# Patient Record
Sex: FEMALE | Race: WHITE | NOT HISPANIC OR LATINO | Employment: FULL TIME | URBAN - METROPOLITAN AREA
[De-identification: names, ages, dates, MRNs, and addresses within clinical notes are randomized per-mention and may not be internally consistent; named-entity substitution may affect disease eponyms.]

---

## 2017-03-13 ENCOUNTER — HOSPITAL ENCOUNTER (EMERGENCY)
Facility: HOSPITAL | Age: 42
Discharge: HOME | End: 2017-03-13
Attending: EMERGENCY MEDICINE | Admitting: EMERGENCY MEDICINE
Payer: COMMERCIAL

## 2017-03-13 ENCOUNTER — APPOINTMENT (EMERGENCY)
Dept: RADIOLOGY | Facility: HOSPITAL | Age: 42
End: 2017-03-13
Payer: COMMERCIAL

## 2017-03-13 VITALS
HEIGHT: 64 IN | DIASTOLIC BLOOD PRESSURE: 74 MMHG | HEART RATE: 98 BPM | SYSTOLIC BLOOD PRESSURE: 123 MMHG | BODY MASS INDEX: 32.93 KG/M2 | WEIGHT: 192.9 LBS | RESPIRATION RATE: 18 BRPM | TEMPERATURE: 98.6 F | OXYGEN SATURATION: 100 %

## 2017-03-13 DIAGNOSIS — K29.60 REFLUX GASTRITIS: Primary | ICD-10-CM

## 2017-03-13 LAB
ALBUMIN SERPL BCP-MCNC: 3.8 G/DL (ref 3.5–5)
ALP SERPL-CCNC: 61 U/L (ref 46–116)
ALT SERPL W P-5'-P-CCNC: 20 U/L (ref 12–78)
ANION GAP SERPL CALCULATED.3IONS-SCNC: 11 MMOL/L (ref 4–13)
AST SERPL W P-5'-P-CCNC: 11 U/L (ref 5–45)
ATRIAL RATE: 110 BPM
BASOPHILS # BLD AUTO: 0.03 THOUSANDS/ΜL (ref 0–0.1)
BASOPHILS NFR BLD AUTO: 0 % (ref 0–1)
BILIRUB SERPL-MCNC: 0.4 MG/DL (ref 0.2–1)
BUN SERPL-MCNC: 10 MG/DL (ref 5–25)
CALCIUM SERPL-MCNC: 9 MG/DL (ref 8.3–10.1)
CHLORIDE SERPL-SCNC: 105 MMOL/L (ref 100–108)
CO2 SERPL-SCNC: 23 MMOL/L (ref 21–32)
CREAT SERPL-MCNC: 0.79 MG/DL (ref 0.6–1.3)
DEPRECATED D DIMER PPP: <270 NG/ML (FEU) (ref 0–424)
EOSINOPHIL # BLD AUTO: 0.02 THOUSAND/ΜL (ref 0–0.61)
EOSINOPHIL NFR BLD AUTO: 0 % (ref 0–6)
ERYTHROCYTE [DISTWIDTH] IN BLOOD BY AUTOMATED COUNT: 12.7 % (ref 11.6–15.1)
GFR SERPL CREATININE-BSD FRML MDRD: >60 ML/MIN/1.73SQ M
GLUCOSE SERPL-MCNC: 131 MG/DL (ref 65–140)
HCT VFR BLD AUTO: 40 % (ref 34.8–46.1)
HGB BLD-MCNC: 13.1 G/DL (ref 11.5–15.4)
INR PPP: 1.14 (ref 0.86–1.16)
LYMPHOCYTES # BLD AUTO: 1.57 THOUSANDS/ΜL (ref 0.6–4.47)
LYMPHOCYTES NFR BLD AUTO: 13 % (ref 14–44)
MCH RBC QN AUTO: 29.8 PG (ref 26.8–34.3)
MCHC RBC AUTO-ENTMCNC: 32.8 G/DL (ref 31.4–37.4)
MCV RBC AUTO: 91 FL (ref 82–98)
MONOCYTES # BLD AUTO: 0.57 THOUSAND/ΜL (ref 0.17–1.22)
MONOCYTES NFR BLD AUTO: 5 % (ref 4–12)
NEUTROPHILS # BLD AUTO: 9.77 THOUSANDS/ΜL (ref 1.85–7.62)
NEUTS SEG NFR BLD AUTO: 81 % (ref 43–75)
NRBC BLD AUTO-RTO: 0 /100 WBCS
P AXIS: 62 DEGREES
PLATELET # BLD AUTO: 332 THOUSANDS/UL (ref 149–390)
PMV BLD AUTO: 9.5 FL (ref 8.9–12.7)
POTASSIUM SERPL-SCNC: 3.9 MMOL/L (ref 3.5–5.3)
PR INTERVAL: 152 MS
PROT SERPL-MCNC: 8.2 G/DL (ref 6.4–8.2)
PROTHROMBIN TIME: 14.5 SECONDS (ref 12–14.3)
QRS AXIS: 83 DEGREES
QRSD INTERVAL: 74 MS
QT INTERVAL: 330 MS
QTC INTERVAL: 446 MS
RBC # BLD AUTO: 4.39 MILLION/UL (ref 3.81–5.12)
SODIUM SERPL-SCNC: 139 MMOL/L (ref 136–145)
T WAVE AXIS: 66 DEGREES
TROPONIN I SERPL-MCNC: <0.02 NG/ML
VENTRICULAR RATE: 110 BPM
WBC # BLD AUTO: 12.01 THOUSAND/UL (ref 4.31–10.16)

## 2017-03-13 PROCEDURE — 96374 THER/PROPH/DIAG INJ IV PUSH: CPT

## 2017-03-13 PROCEDURE — 96361 HYDRATE IV INFUSION ADD-ON: CPT

## 2017-03-13 PROCEDURE — 99284 EMERGENCY DEPT VISIT MOD MDM: CPT

## 2017-03-13 PROCEDURE — 84484 ASSAY OF TROPONIN QUANT: CPT | Performed by: EMERGENCY MEDICINE

## 2017-03-13 PROCEDURE — 85610 PROTHROMBIN TIME: CPT | Performed by: EMERGENCY MEDICINE

## 2017-03-13 PROCEDURE — 85025 COMPLETE CBC W/AUTO DIFF WBC: CPT | Performed by: EMERGENCY MEDICINE

## 2017-03-13 PROCEDURE — 71020 HB CHEST X-RAY 2VW FRONTAL&LATL: CPT

## 2017-03-13 PROCEDURE — 36415 COLL VENOUS BLD VENIPUNCTURE: CPT | Performed by: EMERGENCY MEDICINE

## 2017-03-13 PROCEDURE — 85379 FIBRIN DEGRADATION QUANT: CPT | Performed by: EMERGENCY MEDICINE

## 2017-03-13 PROCEDURE — 80053 COMPREHEN METABOLIC PANEL: CPT | Performed by: EMERGENCY MEDICINE

## 2017-03-13 PROCEDURE — 93005 ELECTROCARDIOGRAM TRACING: CPT | Performed by: EMERGENCY MEDICINE

## 2017-03-13 RX ORDER — DIPHENHYDRAMINE HCL 12.5MG/5ML
50 LIQUID (ML) ORAL ONCE
Status: COMPLETED | OUTPATIENT
Start: 2017-03-13 | End: 2017-03-13

## 2017-03-13 RX ORDER — MAGNESIUM HYDROXIDE/ALUMINUM HYDROXICE/SIMETHICONE 120; 1200; 1200 MG/30ML; MG/30ML; MG/30ML
30 SUSPENSION ORAL ONCE
Status: COMPLETED | OUTPATIENT
Start: 2017-03-13 | End: 2017-03-13

## 2017-03-13 RX ORDER — LORAZEPAM 2 MG/ML
1 INJECTION INTRAMUSCULAR ONCE
Status: COMPLETED | OUTPATIENT
Start: 2017-03-13 | End: 2017-03-13

## 2017-03-13 RX ORDER — SUCRALFATE ORAL 1 G/10ML
1000 SUSPENSION ORAL ONCE
Status: COMPLETED | OUTPATIENT
Start: 2017-03-13 | End: 2017-03-13

## 2017-03-13 RX ORDER — FLUTICASONE FUROATE AND VILANTEROL 100; 25 UG/1; UG/1
POWDER RESPIRATORY (INHALATION)
COMMUNITY
End: 2019-05-10

## 2017-03-13 RX ADMIN — LIDOCAINE HYDROCHLORIDE 15 ML: 20 SOLUTION ORAL; TOPICAL at 12:22

## 2017-03-13 RX ADMIN — SUCRALFATE 1000 MG: 1 SUSPENSION ORAL at 12:22

## 2017-03-13 RX ADMIN — SODIUM CHLORIDE 1000 ML: 0.9 INJECTION, SOLUTION INTRAVENOUS at 12:09

## 2017-03-13 RX ADMIN — DIPHENHYDRAMINE HYDROCHLORIDE 50 MG: 12.5 SOLUTION ORAL at 12:23

## 2017-03-13 RX ADMIN — ALUMINUM HYDROXIDE, MAGNESIUM HYDROXIDE, AND SIMETHICONE 30 ML: 200; 200; 20 SUSPENSION ORAL at 12:23

## 2017-03-13 RX ADMIN — LORAZEPAM 1 MG: 2 INJECTION INTRAMUSCULAR; INTRAVENOUS at 12:21

## 2017-04-21 ENCOUNTER — ALLSCRIPTS OFFICE VISIT (OUTPATIENT)
Dept: OTHER | Facility: OTHER | Age: 42
End: 2017-04-21

## 2017-04-21 ENCOUNTER — APPOINTMENT (OUTPATIENT)
Dept: LAB | Facility: OTHER | Age: 42
End: 2017-04-21
Payer: COMMERCIAL

## 2017-04-21 ENCOUNTER — TRANSCRIBE ORDERS (OUTPATIENT)
Dept: LAB | Facility: OTHER | Age: 42
End: 2017-04-21

## 2017-04-21 DIAGNOSIS — R07.89 OTHER CHEST PAIN: ICD-10-CM

## 2017-04-21 DIAGNOSIS — R00.2 PALPITATIONS: ICD-10-CM

## 2017-04-21 LAB
CHOLEST SERPL-MCNC: 245 MG/DL (ref 50–200)
HDLC SERPL-MCNC: 56 MG/DL (ref 40–60)
LDLC SERPL CALC-MCNC: 168 MG/DL (ref 0–100)
TRIGL SERPL-MCNC: 103 MG/DL

## 2017-04-21 PROCEDURE — 80061 LIPID PANEL: CPT

## 2017-04-21 PROCEDURE — 36415 COLL VENOUS BLD VENIPUNCTURE: CPT

## 2017-04-28 ENCOUNTER — HOSPITAL ENCOUNTER (OUTPATIENT)
Dept: NON INVASIVE DIAGNOSTICS | Facility: CLINIC | Age: 42
Discharge: HOME | End: 2017-04-28
Payer: COMMERCIAL

## 2017-04-28 DIAGNOSIS — R00.2 PALPITATIONS: ICD-10-CM

## 2017-04-28 PROCEDURE — 93225 XTRNL ECG REC<48 HRS REC: CPT

## 2017-04-28 PROCEDURE — 93226 XTRNL ECG REC<48 HR SCAN A/R: CPT

## 2018-01-14 VITALS
SYSTOLIC BLOOD PRESSURE: 130 MMHG | HEIGHT: 63 IN | DIASTOLIC BLOOD PRESSURE: 80 MMHG | WEIGHT: 189 LBS | HEART RATE: 120 BPM | OXYGEN SATURATION: 98 % | BODY MASS INDEX: 33.49 KG/M2

## 2018-12-18 ENCOUNTER — APPOINTMENT (OUTPATIENT)
Dept: URGENT CARE | Facility: CLINIC | Age: 43
End: 2018-12-18
Payer: OTHER MISCELLANEOUS

## 2018-12-18 ENCOUNTER — APPOINTMENT (OUTPATIENT)
Dept: RADIOLOGY | Facility: CLINIC | Age: 43
End: 2018-12-18
Payer: OTHER MISCELLANEOUS

## 2018-12-18 DIAGNOSIS — M25.562 LEFT KNEE PAIN, UNSPECIFIED CHRONICITY: Primary | ICD-10-CM

## 2018-12-18 PROCEDURE — 99283 EMERGENCY DEPT VISIT LOW MDM: CPT

## 2018-12-18 PROCEDURE — G0382 LEV 3 HOSP TYPE B ED VISIT: HCPCS

## 2018-12-18 PROCEDURE — 73562 X-RAY EXAM OF KNEE 3: CPT

## 2018-12-19 ENCOUNTER — TRANSCRIBE ORDERS (OUTPATIENT)
Dept: ADMINISTRATIVE | Facility: HOSPITAL | Age: 43
End: 2018-12-19

## 2018-12-19 DIAGNOSIS — S89.92XA INJURY, KNEE, LEFT, INITIAL ENCOUNTER: Primary | ICD-10-CM

## 2018-12-24 ENCOUNTER — HOSPITAL ENCOUNTER (OUTPATIENT)
Dept: MRI IMAGING | Facility: CLINIC | Age: 43
Discharge: HOME | End: 2018-12-24
Payer: OTHER MISCELLANEOUS

## 2018-12-24 DIAGNOSIS — S89.92XA INJURY, KNEE, LEFT, INITIAL ENCOUNTER: ICD-10-CM

## 2018-12-24 PROCEDURE — 73721 MRI JNT OF LWR EXTRE W/O DYE: CPT

## 2018-12-26 ENCOUNTER — APPOINTMENT (OUTPATIENT)
Dept: URGENT CARE | Facility: CLINIC | Age: 43
End: 2018-12-26
Payer: OTHER MISCELLANEOUS

## 2018-12-26 PROCEDURE — 99213 OFFICE O/P EST LOW 20 MIN: CPT

## 2019-05-10 ENCOUNTER — APPOINTMENT (EMERGENCY)
Dept: RADIOLOGY | Facility: HOSPITAL | Age: 44
End: 2019-05-10
Payer: COMMERCIAL

## 2019-05-10 ENCOUNTER — HOSPITAL ENCOUNTER (OUTPATIENT)
Facility: HOSPITAL | Age: 44
Setting detail: OBSERVATION
Discharge: HOME | End: 2019-05-11
Attending: EMERGENCY MEDICINE | Admitting: FAMILY MEDICINE
Payer: COMMERCIAL

## 2019-05-10 DIAGNOSIS — J45.41 MODERATE PERSISTENT ASTHMA WITH EXACERBATION: Primary | ICD-10-CM

## 2019-05-10 DIAGNOSIS — R09.82 POST-NASAL DRIP: ICD-10-CM

## 2019-05-10 PROBLEM — R65.10 SIRS (SYSTEMIC INFLAMMATORY RESPONSE SYNDROME) (HCC): Status: ACTIVE | Noted: 2019-05-10

## 2019-05-10 LAB
ANION GAP SERPL CALCULATED.3IONS-SCNC: 9 MMOL/L (ref 4–13)
BASOPHILS # BLD AUTO: 0.04 THOUSANDS/ΜL (ref 0–0.1)
BASOPHILS NFR BLD AUTO: 0 % (ref 0–1)
BUN SERPL-MCNC: 9 MG/DL (ref 5–25)
CALCIUM SERPL-MCNC: 8.7 MG/DL (ref 8.3–10.1)
CHLORIDE SERPL-SCNC: 104 MMOL/L (ref 100–108)
CO2 SERPL-SCNC: 27 MMOL/L (ref 21–32)
CREAT SERPL-MCNC: 0.79 MG/DL (ref 0.6–1.3)
EOSINOPHIL # BLD AUTO: 0.37 THOUSAND/ΜL (ref 0–0.61)
EOSINOPHIL NFR BLD AUTO: 3 % (ref 0–6)
ERYTHROCYTE [DISTWIDTH] IN BLOOD BY AUTOMATED COUNT: 12.8 % (ref 11.6–15.1)
EXT PREG TEST URINE: NEGATIVE
GFR SERPL CREATININE-BSD FRML MDRD: 93 ML/MIN/1.73SQ M
GLUCOSE SERPL-MCNC: 181 MG/DL (ref 65–140)
HCT VFR BLD AUTO: 41.4 % (ref 34.8–46.1)
HGB BLD-MCNC: 13.8 G/DL (ref 11.5–15.4)
IMM GRANULOCYTES # BLD AUTO: 0.05 THOUSAND/UL (ref 0–0.2)
IMM GRANULOCYTES NFR BLD AUTO: 0 % (ref 0–2)
LYMPHOCYTES # BLD AUTO: 1.4 THOUSANDS/ΜL (ref 0.6–4.47)
LYMPHOCYTES NFR BLD AUTO: 11 % (ref 14–44)
MCH RBC QN AUTO: 30.9 PG (ref 26.8–34.3)
MCHC RBC AUTO-ENTMCNC: 33.3 G/DL (ref 31.4–37.4)
MCV RBC AUTO: 93 FL (ref 82–98)
MONOCYTES # BLD AUTO: 0.9 THOUSAND/ΜL (ref 0.17–1.22)
MONOCYTES NFR BLD AUTO: 7 % (ref 4–12)
NEUTROPHILS # BLD AUTO: 10.35 THOUSANDS/ΜL (ref 1.85–7.62)
NEUTS SEG NFR BLD AUTO: 79 % (ref 43–75)
NRBC BLD AUTO-RTO: 0 /100 WBCS
PLATELET # BLD AUTO: 347 THOUSANDS/UL (ref 149–390)
PLATELET # BLD AUTO: 350 THOUSANDS/UL (ref 149–390)
PMV BLD AUTO: 9.5 FL (ref 8.9–12.7)
PMV BLD AUTO: 9.6 FL (ref 8.9–12.7)
POTASSIUM SERPL-SCNC: 3.9 MMOL/L (ref 3.5–5.3)
RBC # BLD AUTO: 4.46 MILLION/UL (ref 3.81–5.12)
SODIUM SERPL-SCNC: 140 MMOL/L (ref 136–145)
WBC # BLD AUTO: 13.11 THOUSAND/UL (ref 4.31–10.16)

## 2019-05-10 PROCEDURE — 71046 X-RAY EXAM CHEST 2 VIEWS: CPT

## 2019-05-10 PROCEDURE — 99220 PR INITIAL OBSERVATION CARE/DAY 70 MINUTES: CPT | Performed by: FAMILY MEDICINE

## 2019-05-10 PROCEDURE — 96361 HYDRATE IV INFUSION ADD-ON: CPT

## 2019-05-10 PROCEDURE — 94644 CONT INHLJ TX 1ST HOUR: CPT

## 2019-05-10 PROCEDURE — 80048 BASIC METABOLIC PNL TOTAL CA: CPT | Performed by: PHYSICIAN ASSISTANT

## 2019-05-10 PROCEDURE — 96375 TX/PRO/DX INJ NEW DRUG ADDON: CPT

## 2019-05-10 PROCEDURE — 96366 THER/PROPH/DIAG IV INF ADDON: CPT

## 2019-05-10 PROCEDURE — 99244 OFF/OP CNSLTJ NEW/EST MOD 40: CPT | Performed by: PHYSICIAN ASSISTANT

## 2019-05-10 PROCEDURE — 85049 AUTOMATED PLATELET COUNT: CPT | Performed by: FAMILY MEDICINE

## 2019-05-10 PROCEDURE — 81025 URINE PREGNANCY TEST: CPT | Performed by: PHYSICIAN ASSISTANT

## 2019-05-10 PROCEDURE — C9113 INJ PANTOPRAZOLE SODIUM, VIA: HCPCS | Performed by: FAMILY MEDICINE

## 2019-05-10 PROCEDURE — 36415 COLL VENOUS BLD VENIPUNCTURE: CPT | Performed by: PHYSICIAN ASSISTANT

## 2019-05-10 PROCEDURE — 94640 AIRWAY INHALATION TREATMENT: CPT

## 2019-05-10 PROCEDURE — 85025 COMPLETE CBC W/AUTO DIFF WBC: CPT | Performed by: PHYSICIAN ASSISTANT

## 2019-05-10 PROCEDURE — 94760 N-INVAS EAR/PLS OXIMETRY 1: CPT

## 2019-05-10 PROCEDURE — 99284 EMERGENCY DEPT VISIT MOD MDM: CPT | Performed by: PHYSICIAN ASSISTANT

## 2019-05-10 PROCEDURE — 96365 THER/PROPH/DIAG IV INF INIT: CPT

## 2019-05-10 PROCEDURE — 99285 EMERGENCY DEPT VISIT HI MDM: CPT

## 2019-05-10 RX ORDER — ALBUTEROL SULFATE 2.5 MG/3ML
5 SOLUTION RESPIRATORY (INHALATION) ONCE
Status: COMPLETED | OUTPATIENT
Start: 2019-05-10 | End: 2019-05-10

## 2019-05-10 RX ORDER — LEVALBUTEROL 1.25 MG/.5ML
1.25 SOLUTION, CONCENTRATE RESPIRATORY (INHALATION)
Status: DISCONTINUED | OUTPATIENT
Start: 2019-05-10 | End: 2019-05-11 | Stop reason: HOSPADM

## 2019-05-10 RX ORDER — ALBUTEROL SULFATE 2.5 MG/3ML
2.5 SOLUTION RESPIRATORY (INHALATION) EVERY 4 HOURS PRN
Status: DISCONTINUED | OUTPATIENT
Start: 2019-05-10 | End: 2019-05-11 | Stop reason: HOSPADM

## 2019-05-10 RX ORDER — FLUTICASONE FUROATE AND VILANTEROL 100; 25 UG/1; UG/1
1 POWDER RESPIRATORY (INHALATION) DAILY
Status: DISCONTINUED | OUTPATIENT
Start: 2019-05-10 | End: 2019-05-11 | Stop reason: HOSPADM

## 2019-05-10 RX ORDER — SODIUM CHLORIDE 9 MG/ML
125 INJECTION, SOLUTION INTRAVENOUS CONTINUOUS
Status: DISCONTINUED | OUTPATIENT
Start: 2019-05-10 | End: 2019-05-11

## 2019-05-10 RX ORDER — METHYLPREDNISOLONE SODIUM SUCCINATE 40 MG/ML
40 INJECTION, POWDER, LYOPHILIZED, FOR SOLUTION INTRAMUSCULAR; INTRAVENOUS EVERY 8 HOURS SCHEDULED
Status: DISCONTINUED | OUTPATIENT
Start: 2019-05-10 | End: 2019-05-11 | Stop reason: HOSPADM

## 2019-05-10 RX ORDER — LORATADINE 10 MG/1
10 TABLET ORAL DAILY
Status: DISCONTINUED | OUTPATIENT
Start: 2019-05-10 | End: 2019-05-11 | Stop reason: HOSPADM

## 2019-05-10 RX ORDER — SODIUM CHLORIDE FOR INHALATION 0.9 %
3 VIAL, NEBULIZER (ML) INHALATION ONCE
Status: COMPLETED | OUTPATIENT
Start: 2019-05-10 | End: 2019-05-10

## 2019-05-10 RX ORDER — FLUTICASONE PROPIONATE 50 MCG
2 SPRAY, SUSPENSION (ML) NASAL DAILY
Status: DISCONTINUED | OUTPATIENT
Start: 2019-05-10 | End: 2019-05-11 | Stop reason: HOSPADM

## 2019-05-10 RX ORDER — PANTOPRAZOLE SODIUM 40 MG/1
40 INJECTION, POWDER, FOR SOLUTION INTRAVENOUS EVERY 12 HOURS SCHEDULED
Status: DISCONTINUED | OUTPATIENT
Start: 2019-05-10 | End: 2019-05-11

## 2019-05-10 RX ORDER — SODIUM CHLORIDE FOR INHALATION 0.9 %
3 VIAL, NEBULIZER (ML) INHALATION
Status: DISCONTINUED | OUTPATIENT
Start: 2019-05-10 | End: 2019-05-10

## 2019-05-10 RX ORDER — DIPHENOXYLATE HYDROCHLORIDE AND ATROPINE SULFATE 2.5; .025 MG/1; MG/1
1 TABLET ORAL DAILY
COMMUNITY
End: 2022-06-13 | Stop reason: SDUPTHER

## 2019-05-10 RX ORDER — MAGNESIUM SULFATE HEPTAHYDRATE 40 MG/ML
2 INJECTION, SOLUTION INTRAVENOUS ONCE
Status: COMPLETED | OUTPATIENT
Start: 2019-05-10 | End: 2019-05-10

## 2019-05-10 RX ORDER — MOMETASONE FUROATE 50 UG/1
2 SPRAY, METERED NASAL
COMMUNITY
Start: 2019-04-30 | End: 2022-06-13

## 2019-05-10 RX ORDER — LEVALBUTEROL 1.25 MG/.5ML
1.25 SOLUTION, CONCENTRATE RESPIRATORY (INHALATION)
Status: DISCONTINUED | OUTPATIENT
Start: 2019-05-10 | End: 2019-05-10

## 2019-05-10 RX ORDER — METHYLPREDNISOLONE SODIUM SUCCINATE 125 MG/2ML
125 INJECTION, POWDER, LYOPHILIZED, FOR SOLUTION INTRAMUSCULAR; INTRAVENOUS ONCE
Status: COMPLETED | OUTPATIENT
Start: 2019-05-10 | End: 2019-05-10

## 2019-05-10 RX ADMIN — LORATADINE 10 MG: 10 TABLET ORAL at 18:29

## 2019-05-10 RX ADMIN — SODIUM CHLORIDE 125 ML/HR: 0.9 INJECTION, SOLUTION INTRAVENOUS at 08:06

## 2019-05-10 RX ADMIN — ALBUTEROL SULFATE 2.5 MG: 2.5 SOLUTION RESPIRATORY (INHALATION) at 16:13

## 2019-05-10 RX ADMIN — PANTOPRAZOLE SODIUM 40 MG: 40 INJECTION, POWDER, FOR SOLUTION INTRAVENOUS at 15:41

## 2019-05-10 RX ADMIN — IPRATROPIUM BROMIDE 0.5 MG: 0.5 SOLUTION RESPIRATORY (INHALATION) at 20:15

## 2019-05-10 RX ADMIN — IPRATROPIUM BROMIDE 1 MG: 0.5 SOLUTION RESPIRATORY (INHALATION) at 10:20

## 2019-05-10 RX ADMIN — METHYLPREDNISOLONE SODIUM SUCCINATE 40 MG: 40 INJECTION, POWDER, FOR SOLUTION INTRAMUSCULAR; INTRAVENOUS at 22:59

## 2019-05-10 RX ADMIN — ENOXAPARIN SODIUM 40 MG: 40 INJECTION SUBCUTANEOUS at 15:41

## 2019-05-10 RX ADMIN — ALBUTEROL SULFATE 5 MG: 2.5 SOLUTION RESPIRATORY (INHALATION) at 07:55

## 2019-05-10 RX ADMIN — ALBUTEROL SULFATE 10 MG: 2.5 SOLUTION RESPIRATORY (INHALATION) at 10:20

## 2019-05-10 RX ADMIN — LEVALBUTEROL HYDROCHLORIDE 1.25 MG: 1.25 SOLUTION, CONCENTRATE RESPIRATORY (INHALATION) at 20:15

## 2019-05-10 RX ADMIN — IPRATROPIUM BROMIDE 0.5 MG: 0.5 SOLUTION RESPIRATORY (INHALATION) at 07:55

## 2019-05-10 RX ADMIN — ISODIUM CHLORIDE 3 ML: 0.03 SOLUTION RESPIRATORY (INHALATION) at 10:21

## 2019-05-10 RX ADMIN — SODIUM CHLORIDE 125 ML/HR: 0.9 INJECTION, SOLUTION INTRAVENOUS at 17:31

## 2019-05-10 RX ADMIN — RACEPINEPHRINE HYDROCHLORIDE 0.5 ML: 11.25 SOLUTION RESPIRATORY (INHALATION) at 07:55

## 2019-05-10 RX ADMIN — METHYLPREDNISOLONE SODIUM SUCCINATE 40 MG: 40 INJECTION, POWDER, FOR SOLUTION INTRAMUSCULAR; INTRAVENOUS at 15:42

## 2019-05-10 RX ADMIN — METHYLPREDNISOLONE SODIUM SUCCINATE 125 MG: 125 INJECTION, POWDER, FOR SOLUTION INTRAMUSCULAR; INTRAVENOUS at 08:06

## 2019-05-10 RX ADMIN — MAGNESIUM SULFATE HEPTAHYDRATE 2 G: 40 INJECTION, SOLUTION INTRAVENOUS at 10:12

## 2019-05-10 RX ADMIN — PANTOPRAZOLE SODIUM 40 MG: 40 INJECTION, POWDER, FOR SOLUTION INTRAVENOUS at 23:00

## 2019-05-11 VITALS
RESPIRATION RATE: 18 BRPM | HEIGHT: 68 IN | BODY MASS INDEX: 28.03 KG/M2 | DIASTOLIC BLOOD PRESSURE: 77 MMHG | SYSTOLIC BLOOD PRESSURE: 128 MMHG | OXYGEN SATURATION: 95 % | HEART RATE: 115 BPM | TEMPERATURE: 98.2 F | WEIGHT: 184.97 LBS

## 2019-05-11 LAB
ANION GAP SERPL CALCULATED.3IONS-SCNC: 12 MMOL/L (ref 4–13)
BASOPHILS # BLD MANUAL: 0 THOUSAND/UL (ref 0–0.1)
BASOPHILS NFR MAR MANUAL: 0 % (ref 0–1)
BUN SERPL-MCNC: 12 MG/DL (ref 5–25)
CALCIUM SERPL-MCNC: 8.9 MG/DL (ref 8.3–10.1)
CHLORIDE SERPL-SCNC: 108 MMOL/L (ref 100–108)
CO2 SERPL-SCNC: 19 MMOL/L (ref 21–32)
CREAT SERPL-MCNC: 0.76 MG/DL (ref 0.6–1.3)
EOSINOPHIL # BLD MANUAL: 0 THOUSAND/UL (ref 0–0.4)
EOSINOPHIL NFR BLD MANUAL: 0 % (ref 0–6)
ERYTHROCYTE [DISTWIDTH] IN BLOOD BY AUTOMATED COUNT: 13.1 % (ref 11.6–15.1)
GFR SERPL CREATININE-BSD FRML MDRD: 98 ML/MIN/1.73SQ M
GLUCOSE SERPL-MCNC: 170 MG/DL (ref 65–140)
HCT VFR BLD AUTO: 38.6 % (ref 34.8–46.1)
HGB BLD-MCNC: 13.1 G/DL (ref 11.5–15.4)
LYMPHOCYTES # BLD AUTO: 1.24 THOUSAND/UL (ref 0.6–4.47)
LYMPHOCYTES # BLD AUTO: 6 % (ref 14–44)
MAGNESIUM SERPL-MCNC: 2.4 MG/DL (ref 1.6–2.6)
MCH RBC QN AUTO: 31.3 PG (ref 26.8–34.3)
MCHC RBC AUTO-ENTMCNC: 33.9 G/DL (ref 31.4–37.4)
MCV RBC AUTO: 92 FL (ref 82–98)
MONOCYTES # BLD AUTO: 1.04 THOUSAND/UL (ref 0–1.22)
MONOCYTES NFR BLD: 5 % (ref 4–12)
NEUTROPHILS # BLD MANUAL: 18.23 THOUSAND/UL (ref 1.85–7.62)
NEUTS BAND NFR BLD MANUAL: 4 % (ref 0–8)
NEUTS SEG NFR BLD AUTO: 84 % (ref 43–75)
NRBC BLD AUTO-RTO: 0 /100 WBCS
PLATELET # BLD AUTO: 359 THOUSANDS/UL (ref 149–390)
PLATELET BLD QL SMEAR: ADEQUATE
PMV BLD AUTO: 9.8 FL (ref 8.9–12.7)
POTASSIUM SERPL-SCNC: 4.1 MMOL/L (ref 3.5–5.3)
RBC # BLD AUTO: 4.19 MILLION/UL (ref 3.81–5.12)
SODIUM SERPL-SCNC: 139 MMOL/L (ref 136–145)
TOTAL CELLS COUNTED SPEC: 100
VARIANT LYMPHS # BLD AUTO: 1 %
WBC # BLD AUTO: 20.72 THOUSAND/UL (ref 4.31–10.16)

## 2019-05-11 PROCEDURE — 85027 COMPLETE CBC AUTOMATED: CPT | Performed by: FAMILY MEDICINE

## 2019-05-11 PROCEDURE — 85007 BL SMEAR W/DIFF WBC COUNT: CPT | Performed by: FAMILY MEDICINE

## 2019-05-11 PROCEDURE — 83735 ASSAY OF MAGNESIUM: CPT | Performed by: FAMILY MEDICINE

## 2019-05-11 PROCEDURE — 80048 BASIC METABOLIC PNL TOTAL CA: CPT | Performed by: FAMILY MEDICINE

## 2019-05-11 PROCEDURE — 94640 AIRWAY INHALATION TREATMENT: CPT

## 2019-05-11 PROCEDURE — 99217 PR OBSERVATION CARE DISCHARGE MANAGEMENT: CPT | Performed by: INTERNAL MEDICINE

## 2019-05-11 PROCEDURE — 94760 N-INVAS EAR/PLS OXIMETRY 1: CPT

## 2019-05-11 RX ORDER — PREDNISOLONE SODIUM PHOSPHATE 10 MG/1
10 TABLET, ORALLY DISINTEGRATING ORAL DAILY
Qty: 12 TABLET | Refills: 0 | Status: SHIPPED | OUTPATIENT
Start: 2019-05-11 | End: 2022-06-13

## 2019-05-11 RX ORDER — FLUTICASONE FUROATE AND VILANTEROL 100; 25 UG/1; UG/1
1 POWDER RESPIRATORY (INHALATION) DAILY
Qty: 1 INHALER | Refills: 0 | Status: SHIPPED | OUTPATIENT
Start: 2019-05-12 | End: 2022-06-13

## 2019-05-11 RX ORDER — PANTOPRAZOLE SODIUM 40 MG/1
40 TABLET, DELAYED RELEASE ORAL
Status: DISCONTINUED | OUTPATIENT
Start: 2019-05-11 | End: 2019-05-11 | Stop reason: HOSPADM

## 2019-05-11 RX ADMIN — LEVALBUTEROL HYDROCHLORIDE 1.25 MG: 1.25 SOLUTION, CONCENTRATE RESPIRATORY (INHALATION) at 13:36

## 2019-05-11 RX ADMIN — LEVALBUTEROL HYDROCHLORIDE 1.25 MG: 1.25 SOLUTION, CONCENTRATE RESPIRATORY (INHALATION) at 07:29

## 2019-05-11 RX ADMIN — IPRATROPIUM BROMIDE 0.5 MG: 0.5 SOLUTION RESPIRATORY (INHALATION) at 13:36

## 2019-05-11 RX ADMIN — ALBUTEROL SULFATE 2.5 MG: 2.5 SOLUTION RESPIRATORY (INHALATION) at 01:07

## 2019-05-11 RX ADMIN — METHYLPREDNISOLONE SODIUM SUCCINATE 40 MG: 40 INJECTION, POWDER, FOR SOLUTION INTRAMUSCULAR; INTRAVENOUS at 06:34

## 2019-05-11 RX ADMIN — ENOXAPARIN SODIUM 40 MG: 40 INJECTION SUBCUTANEOUS at 08:36

## 2019-05-11 RX ADMIN — FLUTICASONE PROPIONATE 2 SPRAY: 50 SPRAY, METERED NASAL at 08:36

## 2019-05-11 RX ADMIN — FLUTICASONE FUROATE AND VILANTEROL TRIFENATATE 1 PUFF: 100; 25 POWDER RESPIRATORY (INHALATION) at 08:36

## 2019-05-11 RX ADMIN — LORATADINE 10 MG: 10 TABLET ORAL at 08:36

## 2019-05-11 RX ADMIN — IPRATROPIUM BROMIDE 0.5 MG: 0.5 SOLUTION RESPIRATORY (INHALATION) at 07:29

## 2019-05-11 RX ADMIN — PANTOPRAZOLE SODIUM 40 MG: 40 TABLET, DELAYED RELEASE ORAL at 08:36

## 2021-06-25 ENCOUNTER — IMMUNIZATIONS (OUTPATIENT)
Dept: FAMILY MEDICINE CLINIC | Facility: HOSPITAL | Age: 46
End: 2021-06-25

## 2021-06-25 DIAGNOSIS — Z23 ENCOUNTER FOR IMMUNIZATION: Primary | ICD-10-CM

## 2021-06-25 PROCEDURE — 0001A SARS-COV-2 / COVID-19 MRNA VACCINE (PFIZER-BIONTECH) 30 MCG: CPT

## 2021-06-25 PROCEDURE — 91300 SARS-COV-2 / COVID-19 MRNA VACCINE (PFIZER-BIONTECH) 30 MCG: CPT

## 2021-07-16 ENCOUNTER — IMMUNIZATIONS (OUTPATIENT)
Dept: FAMILY MEDICINE CLINIC | Facility: HOSPITAL | Age: 46
End: 2021-07-16

## 2021-07-16 DIAGNOSIS — Z23 ENCOUNTER FOR IMMUNIZATION: Primary | ICD-10-CM

## 2021-07-16 PROCEDURE — 0002A SARS-COV-2 / COVID-19 MRNA VACCINE (PFIZER-BIONTECH) 30 MCG: CPT

## 2021-07-16 PROCEDURE — 91300 SARS-COV-2 / COVID-19 MRNA VACCINE (PFIZER-BIONTECH) 30 MCG: CPT

## 2022-03-25 ENCOUNTER — IMMUNIZATIONS (OUTPATIENT)
Dept: FAMILY MEDICINE CLINIC | Facility: HOSPITAL | Age: 47
End: 2022-03-25

## 2022-03-25 PROCEDURE — 0051A COVID-19 PFIZER VACC TRIS-SUCROSE GRAY CAP 0.3 ML: CPT

## 2022-03-25 PROCEDURE — 91305 COVID-19 PFIZER VACC TRIS-SUCROSE GRAY CAP 0.3 ML: CPT

## 2022-06-13 PROBLEM — J45.41 MODERATE PERSISTENT ASTHMA WITH EXACERBATION: Status: RESOLVED | Noted: 2019-05-10 | Resolved: 2022-06-13

## 2022-06-13 PROBLEM — R65.10 SIRS (SYSTEMIC INFLAMMATORY RESPONSE SYNDROME) (HCC): Status: RESOLVED | Noted: 2019-05-10 | Resolved: 2022-06-13

## 2022-06-13 RX ORDER — LORATADINE 10 MG/1
10 TABLET ORAL DAILY
COMMUNITY

## 2022-06-13 RX ORDER — MULTIVITAMIN,THERAPEUTIC
1 TABLET ORAL DAILY
COMMUNITY
End: 2022-08-01 | Stop reason: ALTCHOICE

## 2022-06-13 RX ORDER — MONTELUKAST SODIUM 10 MG/1
TABLET ORAL
COMMUNITY
End: 2022-06-14 | Stop reason: ALTCHOICE

## 2022-06-13 RX ORDER — FLUTICASONE FUROATE 100 UG/1
POWDER RESPIRATORY (INHALATION)
COMMUNITY
Start: 2022-03-14

## 2022-06-14 ENCOUNTER — OFFICE VISIT (OUTPATIENT)
Dept: FAMILY MEDICINE CLINIC | Facility: CLINIC | Age: 47
End: 2022-06-14
Payer: COMMERCIAL

## 2022-06-14 DIAGNOSIS — H93.13 TINNITUS OF BOTH EARS: ICD-10-CM

## 2022-06-14 DIAGNOSIS — N92.6 ABNORMAL MENSES: ICD-10-CM

## 2022-06-14 DIAGNOSIS — Z13.220 SCREENING, LIPID: ICD-10-CM

## 2022-06-14 DIAGNOSIS — R53.82 CHRONIC FATIGUE: ICD-10-CM

## 2022-06-14 DIAGNOSIS — L65.9 HAIR LOSS: ICD-10-CM

## 2022-06-14 DIAGNOSIS — J45.30 MILD PERSISTENT ASTHMA WITHOUT COMPLICATION: Primary | ICD-10-CM

## 2022-06-14 DIAGNOSIS — Z12.11 SCREENING FOR COLON CANCER: ICD-10-CM

## 2022-06-14 DIAGNOSIS — J30.9 ALLERGIC RHINITIS, UNSPECIFIED SEASONALITY, UNSPECIFIED TRIGGER: ICD-10-CM

## 2022-06-14 DIAGNOSIS — Z11.59 ENCOUNTER FOR HEPATITIS C SCREENING TEST FOR LOW RISK PATIENT: ICD-10-CM

## 2022-06-14 DIAGNOSIS — R73.01 IFG (IMPAIRED FASTING GLUCOSE): ICD-10-CM

## 2022-06-14 PROCEDURE — 3725F SCREEN DEPRESSION PERFORMED: CPT | Performed by: FAMILY MEDICINE

## 2022-06-14 PROCEDURE — 99204 OFFICE O/P NEW MOD 45 MIN: CPT | Performed by: FAMILY MEDICINE

## 2022-06-14 NOTE — PROGRESS NOTES
Jemal Marrow 1975 female MRN: [id number]      ASSESSMENT/PLAN  Problem List Items Addressed This Visit        Respiratory    Allergic rhinitis    Relevant Orders    CBC and differential    Asthma - Primary    Relevant Medications    Arnuity Ellipta 100 MCG/ACT AEPB inhaler    Other Relevant Orders    CBC and differential      Other Visit Diagnoses     Tinnitus of both ears        Relevant Orders    Ambulatory Referral to Otolaryngology    Chronic fatigue        Relevant Orders    CBC and differential    TSH, 3rd generation with Free T4 reflex    Vitamin D 25 hydroxy    Hair loss        Relevant Orders    CBC and differential    TSH, 3rd generation with Free T4 reflex    Abnormal menses        Relevant Orders    CBC and differential    Estrogens, total    Progesterone    Luteinizing hormone    Follicle stimulating hormone    IFG (impaired fasting glucose)        Relevant Orders    Comprehensive metabolic panel    HEMOGLOBIN A1C W/ EAG ESTIMATION    Screening for colon cancer        Relevant Orders    Cologuard    Screening, lipid        Relevant Orders    Lipid panel    Encounter for hepatitis C screening test for low risk patient        Relevant Orders    Hepatitis C antibody        Asthma/Allergies:  Well controlled on current regimen   Refer to ENT for further evaluation of tinnitus, discussed it may be secondary to high pitch hearing loss   Will update labs as above to eval constellation of symptoms as below     BP WNL   Hep C screening ordered, pt agreeable; HIV screening deferred   Pap UTD (11/2021) -- will link records from [address] [place] [address] UTD (12/2021) -- will link records from [facility] -- agreeable to Cologuard   Vaccinations: Pneumo defererd, TDap deferred, COVID UTD   Encouraged regular physical activity, varied diet, and regular dental/eye exams     Future Appointments   Date Time Provider Mounika Mai   6/17/2022  4:00 PM Page Boss MD [facility] 6/21/2022 10:40 AM Aashish Rosales PA-C [facility] Cardio [facility]   7/5/2022  9:00 AM Mayte Gaffney MD [facility]          SUBJECTIVE  CC: Physical Exam and Tinnitus (X 1 year)      HPI:  Farnaz Campbell is a 41 y o  female who presents to [facility] care  History reviewed and updated as below  Asthma/Allergies: On Arnuity, no MANNY use >1 year; Claritin daily     Review of Systems   Constitutional: Positive for fatigue  Negative for unexpected weight change  (+) hair thinning    HENT: Positive for tinnitus (chronic >1 year)  Negative for congestion, ear pain, rhinorrhea, sneezing and sore throat  (+) fullness under R ear   Eyes: Negative for visual disturbance (wears glasses )  Respiratory: Negative for cough, shortness of breath and wheezing  Cardiovascular: Negative for chest pain, palpitations (feels like she is skipping a beat or racing when she is mid-cycle -- is schedule with Cardiology) and leg swelling  Gastrointestinal: Negative for abdominal pain, constipation and diarrhea  (+) intermittent heart burn   Endocrine: Negative for polyuria  Genitourinary: Negative for dysuria  Menstrual problem: starting to be shorter cycles  Musculoskeletal: Positive for arthralgias (ankles get stiff at the end of the day)  Neurological: Negative for dizziness and headaches  Psychiatric/Behavioral: Negative for sleep disturbance         Historical Information   The patient history was reviewed and updated as follows:    Past Medical History:   Diagnosis Date    Asthma     Dermatitis     Dyslipidemia, goal LDL below 160     Ovarian cyst 02/2008    Varicella without complication      Past Surgical History:   Procedure Laterality Date    NO PAST SURGERIES       Family History   Problem Relation Age of Onset    Diabetes Father     Hepatitis Father     Stroke Maternal Grandmother     Anemia Maternal Grandmother     Breast cancer Paternal Grandmother     Stroke Paternal Grandfather     Coronary artery disease Paternal Grandfather       Social History   Social History     Substance and Sexual Activity   Alcohol Use Yes    Comment: Occasional     Social History     Substance and Sexual Activity   Drug Use Never     Social History     Tobacco Use   Smoking Status Never Smoker   Smokeless Tobacco Never Used       Medications:     Current Outpatient Medications:     Arnuity Ellipta 100 MCG/ACT AEPB inhaler, , Disp: , Rfl:     Fish Oil-Cholecalciferol (COROMEGA OMEGA 3+D SQUEEZE PO), , Disp: , Rfl:     loratadine (CLARITIN) 10 mg tablet, Take 10 mg by mouth daily, Disp: , Rfl:     Multiple Vitamins-Minerals (Oncovite) TABS, Take 1 tablet by mouth daily, Disp: , Rfl:   Allergies   Allergen Reactions    [place] (Diagnostic) - Food Allergy Other (See Comments)    Cat Hair Extract Other (See Comments)    Cephalexin Hives and Other (See Comments)    Dog Epithelium Other (See Comments)    Egg White (Diagnostic) - Food Allergy Other (See Comments)    Penicillins Hives     Other reaction(s): Unknown      Pollen Extract Other (See Comments)    Latex Itching, Rash and Swelling       OBJECTIVE    Vitals:   Vitals:    06/14/22 1334   BP: 128/80   Pulse: 89   Temp: 98 9 °F (37 2 °C)   SpO2: 99%   Weight: 80 7 kg (178 lb)   Height: 5' 8" (1 727 m)           Physical Exam  Vitals and nursing note reviewed  Constitutional:       General: She is not in acute distress  Appearance: Normal appearance  HENT:      Head: Normocephalic and atraumatic  Right Ear: Tympanic membrane, ear canal and external ear normal       Left Ear: Tympanic membrane, ear canal and external ear normal       Nose: Nose normal       Mouth/Throat:      Mouth: Mucous membranes are moist       Pharynx: No oropharyngeal exudate or posterior oropharyngeal erythema  Eyes:      Conjunctiva/sclera: Conjunctivae normal    Cardiovascular:      Rate and Rhythm: Normal rate and regular rhythm     Pulmonary: Effort: Pulmonary effort is normal  No respiratory distress  Breath sounds: Normal breath sounds  Abdominal:      General: Bowel sounds are normal  There is no distension  Palpations: Abdomen is soft  Tenderness: There is no abdominal tenderness  Musculoskeletal:      Right lower leg: No edema  Left lower leg: No edema  Lymphadenopathy:      Cervical: No cervical adenopathy  Skin:     General: Skin is warm and dry  Neurological:      General: No focal deficit present  Mental Status: She is alert     Psychiatric:         Mood and Affect: Mood normal                     Kristin Camargo DO  [facility]   6/14/2022  2:04 PM

## 2022-06-15 ENCOUNTER — TELEPHONE (OUTPATIENT)
Dept: ADMINISTRATIVE | Facility: OTHER | Age: 47
End: 2022-06-15

## 2022-06-15 VITALS
SYSTOLIC BLOOD PRESSURE: 128 MMHG | HEIGHT: 65 IN | OXYGEN SATURATION: 99 % | BODY MASS INDEX: 29.66 KG/M2 | DIASTOLIC BLOOD PRESSURE: 80 MMHG | HEART RATE: 89 BPM | WEIGHT: 178 LBS | TEMPERATURE: 98.9 F

## 2022-06-15 NOTE — TELEPHONE ENCOUNTER
----- Message from Asael Dsouza MA sent at 6/14/2022  1:43 PM EDT -----  Regarding: SANTOSH/ PAP  06/14/22 1:43 PM    Hello, our patient Bob Mckeon has had Mammogram and Pap Smear (HPV) aka Cervical Cancer Screening completed/performed  Please assist in updating the patient chart by pulling the Care Everywhere (CE) document  The date of service is 2021       Thank you,  Elo Engel MA   ISABELL SYKES

## 2022-06-15 NOTE — TELEPHONE ENCOUNTER
Upon review of the In Basket request we were able to locate, review, and update the patient chart as requested for Mammogram  Pap smear  Any additional questions or concerns should be emailed to the Practice Liaisons via [email or web address] email, please do not reply via In Basket      Thank you  Bindu Harris

## 2022-06-17 ENCOUNTER — OFFICE VISIT (OUTPATIENT)
Dept: INTERNAL MEDICINE CLINIC | Facility: CLINIC | Age: 47
End: 2022-06-17
Payer: COMMERCIAL

## 2022-06-17 VITALS
WEIGHT: 179.4 LBS | BODY MASS INDEX: 29.89 KG/M2 | DIASTOLIC BLOOD PRESSURE: 78 MMHG | TEMPERATURE: 98 F | OXYGEN SATURATION: 98 % | SYSTOLIC BLOOD PRESSURE: 130 MMHG | HEART RATE: 92 BPM | HEIGHT: 65 IN | RESPIRATION RATE: 18 BRPM

## 2022-06-17 DIAGNOSIS — E66.3 OVERWEIGHT: ICD-10-CM

## 2022-06-17 DIAGNOSIS — J45.40 MODERATE PERSISTENT ASTHMA WITHOUT COMPLICATION: Primary | ICD-10-CM

## 2022-06-17 DIAGNOSIS — J30.89 NON-SEASONAL ALLERGIC RHINITIS, UNSPECIFIED TRIGGER: ICD-10-CM

## 2022-06-17 PROCEDURE — 99204 OFFICE O/P NEW MOD 45 MIN: CPT | Performed by: INTERNAL MEDICINE

## 2022-06-17 NOTE — PROGRESS NOTES
INTERNAL MEDICINE OFFICE VISIT  [facility] [address], [place], [address] [facility]  Tel: [phone]      NAME: Monika Shaver  AGE: 41 y o  SEX: female  : 1975   MRN: [id number]    DATE: 2022  TIME: 4:17 PM      Assessment and Plan:  1  Moderate persistent asthma without complication  Continue inhalers    2  Non-seasonal allergic rhinitis, unspecified trigger   continue Claritin    3  Overweight  BMI Counseling: Body mass index is 29 85 kg/m²  The BMI is above normal  Nutrition recommendations include decreasing portion sizes, encouraging healthy choices of fruits and vegetables and moderation in carbohydrate intake  Exercise recommendations include moderate physical activity 150 minutes/week  Rationale for BMI follow-up plan is due to patient being overweight or obese  - Counseling Documentation: patient was counseled regarding: instructions for management, risk factor reductions, prognosis, patient and family education, risks and benefits of treatment options and importance of compliance with treatment  - Medication Side Effects: Adverse side effects of medications were reviewed with the patient/guardian today  Return for follow up visit in  1 year or earlier, if needed  Chief Complaint:  Chief Complaint   Patient presents with    New Patient Visit         History of Present Illness:    is a new patient was here to establish  She has asthma for which she is taking the inhaler which is prescribed by the pulmonologist     She also has allergies which are non seasonal and to multiple things    She has to take Claritin around the year  She needs to lose weight      Active Problem List:  Patient Active Problem List   Diagnosis    Allergic rhinitis    Moderate persistent asthma without complication    Overweight         Past Medical History:  Past Medical History:   Diagnosis Date    Asthma     Dermatitis     Dyslipidemia, goal LDL below 160     Ovarian cyst 02/2008    Varicella without complication          Past Surgical History:  Past Surgical History:   Procedure Laterality Date    NO PAST SURGERIES           Family History:  Family History   Problem Relation Age of Onset    Diabetes Father     Hepatitis Father     Stroke Maternal Grandmother     Anemia Maternal Grandmother     Breast cancer Paternal Grandmother     Stroke Paternal Grandfather     Coronary artery disease Paternal Grandfather          Social History:  Social History     Socioeconomic History    Marital status: /Civil Union     Spouse name: None    Number of children: None    Years of education: None    Highest education level: None   Occupational History    None   Tobacco Use    Smoking status: Never Smoker    Smokeless tobacco: Never Used   Vaping Use    Vaping Use: Never used   Substance and Sexual Activity    Alcohol use: Yes     Comment: Occasional    Drug use: Never    Sexual activity: None   Other Topics Concern    None   Social History Narrative    Lives with     Work as a  for non-profit      Social [employer]     Financial Resource Strain: Not on file   Food Insecurity: Not on file   Transportation Needs: Not on file   Physical Activity: Not on file   Stress: Not on file   Social Connections: Not on file   Intimate Partner Violence: Not on file   Housing Stability: Not on file         Allergies:   Allergies   Allergen Reactions    [place] (Diagnostic) - Food Allergy Other (See Comments)    Cat Hair Extract Other (See Comments)    Cephalexin Hives and Other (See Comments)    Dog Epithelium Other (See Comments)    Egg White (Diagnostic) - Food Allergy Other (See Comments)    Penicillins Hives     Other reaction(s): Unknown      Pollen Extract Other (See Comments)    Latex Itching, Rash and Swelling         Medications:    Current Outpatient Medications:     Arnuity Ellipta 100 MCG/ACT AEPB inhaler, , Disp: , Rfl:     Fish Oil-Cholecalciferol (Nani Rajesh 3+D SQUEEZE PO), , Disp: , Rfl:     loratadine (CLARITIN) 10 mg tablet, Take 10 mg by mouth daily, Disp: , Rfl:     Multiple Vitamins-Minerals (Oncovite) TABS, Take 1 tablet by mouth daily, Disp: , Rfl:       The following portions of the patient's history were reviewed and updated as appropriate: past medical history, past surgical history, family history, social history, allergies, current medications and active problem list       Review of Systems:  Constitutional: Denies fever, chills, weight gain, weight loss, fatigue  Eyes: Denies eye redness, eye discharge, double vision, change in visual acuity  ENT: Denies hearing loss, tinnitus, sneezing, nasal congestion, nasal discharge, sore throat   Respiratory: Denies cough, expectoration, hemoptysis, shortness of breath, wheezing  Cardiovascular: Denies chest pain, palpitations, lower extremity swelling, orthopnea, PND  Gastrointestinal: Denies abdominal pain, heartburn, nausea, vomiting, hematemesis, diarrhea, bloody stools  Genito-Urinary: Denies dysuria, frequency, difficulty in micturition, nocturia, incontinence  Musculoskeletal: Denies back pain, joint pain, muscle pain  Neurologic: Denies confusion, lightheadedness, syncope, headache, focal weakness, sensory changes, seizures  Endocrine: Denies polyuria, polydipsia, temperature intolerance  Allergy and Immunology: Denies hives, insect bite sensitivity  Hematological and Lymphatic: Denies bleeding problems, swollen glands   Psychological: Denies depression, suicidal ideation, anxiety, panic, mood swings  Dermatological: Denies pruritus, rash, skin lesion changes      Vitals:  Vitals:    06/17/22 1616   BP: 130/78   Pulse:    Resp:    Temp:    SpO2:        Body mass index is 29 85 kg/m²  Weight (last 2 days)     Date/Time Weight    06/17/22 1549 81 4 (179 4)            Physical Examination:  General: Patient is not in acute distress  Awake, alert, responding to commands  No weight gain or loss  Head: Normocephalic  Atraumatic  Eyes: Conjunctiva and lids with no swelling, erythema or discharge  Both pupils normal sized, round and reactive to light  Sclera nonicteric  ENT: External examination of nose and ear normal  Otoscopic examination shows translucent tympanic membranes with patent canals without erythema  Oropharynx moist with no erythema, edema, exudate or lesions  Neck: Supple  JVP not raised  Trachea midline  No masses  No thyromegaly  Lungs: No signs of increased work of breathing or respiratory distress  Bilateral bronchovascular breath sounds with no crackles or rhonchi  Chest wall: No tenderness  Cardiovascular: Normal PMI  No thrills  Regular rate and rhythm  S1 and S2 normal  No murmur, rub or gallop  Gastrointestinal: Abdomen soft, nontender  No guarding or rigidity  Liver and spleen not palpable  Bowel sounds present  Neurologic: Cranial nerves II-XII intact   Cortical functions normal  Motor system - Reflexes 2+ and symmetrical  Sensations normal  Musculoskeletal: Gait normal  No joint tenderness  Integumentary: Skin normal with no rash or lesions  Lymphatic: No palpable lymph nodes in neck, axilla or groin  Extremities: No clubbing, cyanosis, edema or varicosities  Psychological: Judgement and insight normal  Mood and affect normal      Laboratory Results:  CBC with diff:   Lab Results   Component Value Date    WBC 20 72 (H) 05/11/2019    WBC 8 84 01/13/2014    RBC 4 19 05/11/2019    RBC 4 94 01/13/2014    HGB 13 1 05/11/2019    HGB 14 9 01/13/2014    HCT 38 6 05/11/2019    HCT 45 6 01/13/2014    MCV 92 05/11/2019    MCV 92 01/13/2014    MCH 31 3 05/11/2019    MCH 30 2 01/13/2014    RDW 13 1 05/11/2019    RDW 12 7 01/13/2014     05/11/2019     01/13/2014       CMP:  Lab Results   Component Value Date    CREATININE 0 76 05/11/2019    CREATININE 0 71 01/13/2014    BUN 12 05/11/2019    BUN 15 01/13/2014     01/13/2014    K 4 1 05/11/2019    K 4 8 01/13/2014     05/11/2019     01/13/2014    CO2 19 (L) 05/11/2019    CO2 27 01/13/2014    GLUCOSE 91 01/13/2014    PROT 8 4 (H) 01/13/2014    ALKPHOS 61 03/13/2017    ALKPHOS 65 01/13/2014    ALT 20 03/13/2017    ALT 24 01/13/2014    AST 11 03/13/2017    AST 26 01/13/2014       Lab Results   Component Value Date    MG 2 4 05/11/2019       Lab Results   Component Value Date    TROPONINI <0 02 03/13/2017       Lipid Profile:   No results found for: CHOL  Lab Results   Component Value Date    HDL 56 04/21/2017     Lab Results   Component Value Date    LDLCALC 168 (H) 04/21/2017     Lab Results   Component Value Date    TRIG 103 04/21/2017       Imaging Results:  Hm Mammography  This Care Everywhere (CE) document can be found within the Care Everywhere (CE) Activity  Linking this document to the order is not available at this time  Hm Mammography  This Care Everywhere (CE) document can be found within the Care Everywhere (CE) Activity  Linking this document to the order is not available at this time          Health Maintenance:  Health Maintenance   Topic Date Due    Hepatitis C Screening  Never done    BMI: Followup Plan  Never done    Annual Physical  Never done    Colorectal Cancer Screening  05/23/2022    Influenza Vaccine (Season Ended) 09/01/2022    HIV Screening  07/14/2022 (Originally 5/23/1992)    Pneumococcal Vaccine: Pediatrics (0 to 5 Years) and At-Risk Patients (6 to 59 Years) (1 - PCV) 06/14/2023 (Originally 5/23/1983)    DTaP,Tdap,and Td Vaccines (3 - Td or Tdap) 06/14/2023 (Originally 1/1/2021)    Breast Cancer Screening: Mammogram  12/01/2022    Depression Screening  06/14/2023    BMI: Adult  06/17/2023    Cervical Cancer Screening  11/24/2026    COVID-19 Vaccine  Completed    HIB Vaccine  Aged Out    Hepatitis B Vaccine  Aged Out    IPV Vaccine  Aged Out    Hepatitis A Vaccine  Aged Out    Meningococcal ACWY Vaccine  Aged Out    HPV Vaccine  Aged Out     Immunization History   Administered Date(s) Administered    COVID-19 PFIZER VACCINE 0 3 ML IM 06/25/2021, 07/16/2021    COVID-19 Pfizer vac (Terry-sucrose, gray cap) 12 yr+ IM 03/25/2022    Hep B, adult 12/10/2010    Tdap 12/10/2010, 01/01/2011         Andria Hernández MD  6/17/2022,4:17 PM  Answers for HPI/ROS submitted by the patient on 6/15/2022  Affected ear: right  Chronicity: recurrent  Onset: more than 1 month ago  Progression since onset: unchanged  Frequency: every few hours  Fever: no fever  Pain - numeric: 2/10  abdominal pain: No  ear discharge: No  rash: No  cough: No  headaches: No  rhinorrhea: Yes  diarrhea: No  hearing loss: No  sore throat: No  neck pain: Yes  vomiting: No

## 2022-06-21 ENCOUNTER — OFFICE VISIT (OUTPATIENT)
Dept: CARDIOLOGY CLINIC | Facility: CLINIC | Age: 47
End: 2022-06-21
Payer: COMMERCIAL

## 2022-06-21 VITALS
BODY MASS INDEX: 31.54 KG/M2 | SYSTOLIC BLOOD PRESSURE: 110 MMHG | HEIGHT: 63 IN | WEIGHT: 178 LBS | HEART RATE: 77 BPM | DIASTOLIC BLOOD PRESSURE: 82 MMHG

## 2022-06-21 DIAGNOSIS — R00.2 PALPITATION: Primary | ICD-10-CM

## 2022-06-21 DIAGNOSIS — R06.02 SOB (SHORTNESS OF BREATH): ICD-10-CM

## 2022-06-21 PROCEDURE — 93000 ELECTROCARDIOGRAM COMPLETE: CPT | Performed by: PHYSICIAN ASSISTANT

## 2022-06-21 PROCEDURE — 1036F TOBACCO NON-USER: CPT | Performed by: PHYSICIAN ASSISTANT

## 2022-06-21 PROCEDURE — 3008F BODY MASS INDEX DOCD: CPT | Performed by: PHYSICIAN ASSISTANT

## 2022-06-21 PROCEDURE — 99204 OFFICE O/P NEW MOD 45 MIN: CPT | Performed by: PHYSICIAN ASSISTANT

## 2022-06-21 NOTE — PROGRESS NOTES
[facility]   Outpatient Note  Jose Alberto Sorto  1975  [id number]  [address] [facility]  [address]  [facility] [address]  [address]    Jose Alberto Sorto is a 41 y o  female    Assessment and Plan:   SOB (shortness of breath)  Breathlessness with exertion in the setting of asthma; however would like to rule out cardiac etiology  Two-dimensional echocardiogram will be obtained to assess LV function and to check for [facility]    Palpitation  Sensation of skipped beats that is usually felt during her menstrual  cycle (usually midcycle)  This usually takes her breath away and lasts seconds in duration  Zio monitor will be obtained to assess for concerning arrhythmias  Additional Plan:   Medications as detailed above  Pertinent testing orders as outlined  Available lab and test results are reviewed with the patient and any additional required labs are ordered as noted  Return visit will be in one month or earlier if there are problems  The patient is encouraged to call in the meantime if there are questions or concerns  Subjective: The patient is seen in the office today as a new patient with complaints of palpitations and shortness of breath  Her shortness of breath is in the setting of asthma  Usually alleviated with her breathing treatments  This has been occurring for years and has been stable  Palpitations are described as a sensation of "skipped beats" that is usually felt during her menstrual  cycle (usually midcycle)  This usually "takes her breath away" and lasts seconds in duration  At times it feels as though her heart is speeding up  She is concerned because strokes run in her family her maternal grandmother had a stroke at the age of 79 and her paternal grandfather had a stroke at the age of 46  Otherwise, From a cardiac perspective, she is without complaints of chest pain  She has no syncope or near syncope, and denies edema orthopnea or PND  She does not complain of TIA or CVA symptoms  She denies any exertional neck, jaw, arm or back pain  Social History  Social History     Tobacco Use   Smoking Status Never Smoker   Smokeless Tobacco Never Used   ,   Social History     Substance and Sexual Activity   Alcohol Use Yes    Comment: Occasional   ,   Social History     Substance and Sexual Activity   Drug Use Never     Family History   Problem Relation Age of Onset    Diabetes Father     Hepatitis Father     Stroke Maternal Grandmother     Anemia Maternal Grandmother     Breast cancer Paternal Wolm Bimler     Stroke Paternal Grandfather     Coronary artery disease Paternal Grandfather        Medical and Surgical History  Past Medical History:   Diagnosis Date    Asthma     Dermatitis     Dyslipidemia, goal LDL below 160     Ovarian cyst 02/2008    Varicella without complication      Past Surgical History:   Procedure Laterality Date    NO PAST SURGERIES           Current Outpatient Medications:     Arnuity Ellipta 100 MCG/ACT AEPB inhaler, , Disp: , Rfl:     Fish Oil-Cholecalciferol (COROMEGA OMEGA 3+D SQUEEZE PO), , Disp: , Rfl:     loratadine (CLARITIN) 10 mg tablet, Take 10 mg by mouth daily, Disp: , Rfl:     Multiple Vitamins-Minerals (Oncovite) TABS, Take 1 tablet by mouth daily, Disp: , Rfl:   Allergies   Allergen Reactions    [place] (Diagnostic) - Food Allergy Other (See Comments)    Cat Hair Extract Other (See Comments)    Cephalexin Hives and Other (See Comments)    Dog Epithelium Other (See Comments)    Egg White (Diagnostic) - Food Allergy Other (See Comments)    Penicillins Hives     Other reaction(s): Unknown      Pollen Extract Other (See Comments)    Latex Itching, Rash and Swelling       Review of Systems   Constitutional: Negative  HENT: Negative  Eyes: Negative      Cardiovascular: Positive for dyspnea on exertion and palpitations  Negative for chest pain, claudication, cyanosis, irregular heartbeat, leg swelling, near-syncope, orthopnea, paroxysmal nocturnal dyspnea and syncope  Respiratory: Negative  Negative for cough, hemoptysis, shortness of breath, sleep disturbances due to breathing, snoring, sputum production and wheezing  Endocrine: Negative  Hematologic/Lymphatic: Negative  Skin: Negative  Musculoskeletal: Negative  Gastrointestinal: Negative  Genitourinary: Negative  Neurological: Negative  Psychiatric/Behavioral: Negative  Allergic/Immunologic: Negative  Objective:   /82   Pulse 77   Ht 5' 3" (1 6 m)   Wt 80 7 kg (178 lb)   BMI 31 53 kg/m²   Physical Exam  Vitals and nursing note reviewed  Constitutional:       Appearance: She is well-developed  She is obese  HENT:      Head: Normocephalic and atraumatic  Mouth/Throat:      Mouth: Mucous membranes are moist    Eyes:      General: No scleral icterus  Conjunctiva/sclera: Conjunctivae normal    Neck:      Vascular: No JVD  Trachea: No tracheal deviation  Cardiovascular:      Rate and Rhythm: Normal rate and regular rhythm  Pulses: Intact distal pulses  Heart sounds: Normal heart sounds, S1 normal and S2 normal  No murmur heard  No friction rub  No gallop  Pulmonary:      Effort: Pulmonary effort is normal  No respiratory distress  Breath sounds: Normal breath sounds  No wheezing or rales  Chest:      Chest wall: No tenderness  Abdominal:      General: Bowel sounds are normal  There is no distension  Palpations: Abdomen is soft  Tenderness: There is no abdominal tenderness  Comments: Aorta not palpable    Musculoskeletal:         General: No tenderness  Normal range of motion  Cervical back: Normal range of motion and neck supple  Right lower leg: No edema  Left lower leg: No edema  Skin:     General: Skin is warm and dry        Coloration: Skin is not pale       Findings: No erythema or rash  Neurological:      General: No focal deficit present  Mental Status: She is alert and oriented to person, place, and time     Psychiatric:         Mood and Affect: Mood normal          Behavior: Behavior normal          Judgment: Judgment normal          Lab Review:   No results found for: CHOL  Lab Results   Component Value Date    HDL 56 04/21/2017     Lab Results   Component Value Date    LDLCALC 168 (H) 04/21/2017     Lab Results   Component Value Date    TRIG 103 04/21/2017     Results Reviewed     None        Results Reviewed     None        Results Reviewed     None          Recent Cardiovascular Testing:   Holter monitor and echo will be obtained    ECG Review:   06/21/2022: Normal sinus rhythm

## 2022-06-21 NOTE — ASSESSMENT & PLAN NOTE
Sensation of skipped beats that is usually felt during her menstrual  cycle (usually midcycle)  This usually takes her breath away and lasts seconds in duration  At times it feels as though her heart is speeding up  Zio monitor will be obtained to assess for concerning arrhythmias

## 2022-06-21 NOTE — ASSESSMENT & PLAN NOTE
Breathlessness with exertion in the setting of asthma; however would like to rule out cardiac etiology    Two-dimensional echocardiogram will be obtained to assess LV function and to check for [facility]

## 2022-06-24 PROBLEM — J45.30 MILD PERSISTENT ASTHMA WITHOUT COMPLICATION: Status: ACTIVE | Noted: 2022-06-17

## 2022-06-24 PROBLEM — R06.02 SOB (SHORTNESS OF BREATH): Status: RESOLVED | Noted: 2022-06-21 | Resolved: 2022-06-24

## 2022-07-13 ENCOUNTER — APPOINTMENT (OUTPATIENT)
Dept: LAB | Facility: CLINIC | Age: 47
End: 2022-07-13
Payer: COMMERCIAL

## 2022-07-13 DIAGNOSIS — Z13.220 SCREENING, LIPID: ICD-10-CM

## 2022-07-13 DIAGNOSIS — R73.01 IFG (IMPAIRED FASTING GLUCOSE): ICD-10-CM

## 2022-07-13 DIAGNOSIS — J30.9 ALLERGIC RHINITIS, UNSPECIFIED SEASONALITY, UNSPECIFIED TRIGGER: ICD-10-CM

## 2022-07-13 DIAGNOSIS — J45.30 MILD PERSISTENT ASTHMA WITHOUT COMPLICATION: ICD-10-CM

## 2022-07-13 DIAGNOSIS — N92.6 ABNORMAL MENSES: ICD-10-CM

## 2022-07-13 DIAGNOSIS — Z11.59 ENCOUNTER FOR HEPATITIS C SCREENING TEST FOR LOW RISK PATIENT: ICD-10-CM

## 2022-07-13 DIAGNOSIS — R53.82 CHRONIC FATIGUE: ICD-10-CM

## 2022-07-13 DIAGNOSIS — H91.8X9 ASYMMETRICAL HEARING LOSS: ICD-10-CM

## 2022-07-13 DIAGNOSIS — H93.13 TINNITUS OF BOTH EARS: ICD-10-CM

## 2022-07-13 DIAGNOSIS — L65.9 HAIR LOSS: ICD-10-CM

## 2022-07-13 LAB
25(OH)D3 SERPL-MCNC: 39.7 NG/ML (ref 30–100)
ALBUMIN SERPL BCP-MCNC: 3.8 G/DL (ref 3.5–5)
ALP SERPL-CCNC: 59 U/L (ref 46–116)
ALT SERPL W P-5'-P-CCNC: 36 U/L (ref 12–78)
ANION GAP SERPL CALCULATED.3IONS-SCNC: 3 MMOL/L (ref 4–13)
AST SERPL W P-5'-P-CCNC: 22 U/L (ref 5–45)
BASOPHILS # BLD AUTO: 0.04 THOUSANDS/ΜL (ref 0–0.1)
BASOPHILS NFR BLD AUTO: 1 % (ref 0–1)
BILIRUB SERPL-MCNC: 0.53 MG/DL (ref 0.2–1)
BUN SERPL-MCNC: 12 MG/DL (ref 5–25)
CALCIUM SERPL-MCNC: 9 MG/DL (ref 8.3–10.1)
CHLORIDE SERPL-SCNC: 107 MMOL/L (ref 100–108)
CHOLEST SERPL-MCNC: 242 MG/DL
CO2 SERPL-SCNC: 28 MMOL/L (ref 21–32)
CREAT SERPL-MCNC: 0.8 MG/DL (ref 0.6–1.3)
EOSINOPHIL # BLD AUTO: 0.94 THOUSAND/ΜL (ref 0–0.61)
EOSINOPHIL NFR BLD AUTO: 12 % (ref 0–6)
ERYTHROCYTE [DISTWIDTH] IN BLOOD BY AUTOMATED COUNT: 12.8 % (ref 11.6–15.1)
FSH SERPL-ACNC: 4.1 MIU/ML
GFR SERPL CREATININE-BSD FRML MDRD: 89 ML/MIN/1.73SQ M
GLUCOSE P FAST SERPL-MCNC: 100 MG/DL (ref 65–99)
HCT VFR BLD AUTO: 41 % (ref 34.8–46.1)
HDLC SERPL-MCNC: 61 MG/DL
HGB BLD-MCNC: 13.4 G/DL (ref 11.5–15.4)
IMM GRANULOCYTES # BLD AUTO: 0.02 THOUSAND/UL (ref 0–0.2)
IMM GRANULOCYTES NFR BLD AUTO: 0 % (ref 0–2)
LDLC SERPL CALC-MCNC: 160 MG/DL (ref 0–100)
LH SERPL-ACNC: 3 MIU/ML
LYMPHOCYTES # BLD AUTO: 2.19 THOUSANDS/ΜL (ref 0.6–4.47)
LYMPHOCYTES NFR BLD AUTO: 27 % (ref 14–44)
MCH RBC QN AUTO: 30.9 PG (ref 26.8–34.3)
MCHC RBC AUTO-ENTMCNC: 32.7 G/DL (ref 31.4–37.4)
MCV RBC AUTO: 95 FL (ref 82–98)
MONOCYTES # BLD AUTO: 0.61 THOUSAND/ΜL (ref 0.17–1.22)
MONOCYTES NFR BLD AUTO: 7 % (ref 4–12)
NEUTROPHILS # BLD AUTO: 4.39 THOUSANDS/ΜL (ref 1.85–7.62)
NEUTS SEG NFR BLD AUTO: 53 % (ref 43–75)
NONHDLC SERPL-MCNC: 181 MG/DL
NRBC BLD AUTO-RTO: 0 /100 WBCS
PLATELET # BLD AUTO: 333 THOUSANDS/UL (ref 149–390)
PMV BLD AUTO: 10.5 FL (ref 8.9–12.7)
POTASSIUM SERPL-SCNC: 4 MMOL/L (ref 3.5–5.3)
PROGEST SERPL-MCNC: 0.4 NG/ML
PROT SERPL-MCNC: 7.8 G/DL (ref 6.4–8.2)
RBC # BLD AUTO: 4.34 MILLION/UL (ref 3.81–5.12)
SODIUM SERPL-SCNC: 138 MMOL/L (ref 136–145)
TRIGL SERPL-MCNC: 105 MG/DL
TSH SERPL DL<=0.05 MIU/L-ACNC: 2.21 UIU/ML (ref 0.45–4.5)
WBC # BLD AUTO: 8.19 THOUSAND/UL (ref 4.31–10.16)

## 2022-07-13 PROCEDURE — 82672 ASSAY OF ESTROGEN: CPT

## 2022-07-13 PROCEDURE — 82306 VITAMIN D 25 HYDROXY: CPT

## 2022-07-13 PROCEDURE — 83001 ASSAY OF GONADOTROPIN (FSH): CPT

## 2022-07-13 PROCEDURE — 84144 ASSAY OF PROGESTERONE: CPT

## 2022-07-13 PROCEDURE — 83036 HEMOGLOBIN GLYCOSYLATED A1C: CPT

## 2022-07-13 PROCEDURE — 36415 COLL VENOUS BLD VENIPUNCTURE: CPT

## 2022-07-13 PROCEDURE — 83002 ASSAY OF GONADOTROPIN (LH): CPT

## 2022-07-13 PROCEDURE — 80053 COMPREHEN METABOLIC PANEL: CPT

## 2022-07-13 PROCEDURE — 84443 ASSAY THYROID STIM HORMONE: CPT

## 2022-07-13 PROCEDURE — 80061 LIPID PANEL: CPT

## 2022-07-13 PROCEDURE — 85025 COMPLETE CBC W/AUTO DIFF WBC: CPT

## 2022-07-13 PROCEDURE — 86803 HEPATITIS C AB TEST: CPT

## 2022-07-14 LAB
EST. AVERAGE GLUCOSE BLD GHB EST-MCNC: 100 MG/DL
HBA1C MFR BLD: 5.1 %
HCV AB SER QL: NORMAL

## 2022-07-15 ENCOUNTER — CLINICAL SUPPORT (OUTPATIENT)
Dept: CARDIOLOGY CLINIC | Facility: CLINIC | Age: 47
End: 2022-07-15
Payer: COMMERCIAL

## 2022-07-15 DIAGNOSIS — R00.2 PALPITATION: ICD-10-CM

## 2022-07-15 DIAGNOSIS — R06.02 SOB (SHORTNESS OF BREATH): ICD-10-CM

## 2022-07-15 PROCEDURE — 93244 EXT ECG>48HR<7D REV&INTERPJ: CPT | Performed by: INTERNAL MEDICINE

## 2022-07-18 LAB — ESTROGEN SERPL-MCNC: 211 PG/ML

## 2022-07-22 NOTE — RESULT ENCOUNTER NOTE
Basic mechanism was sinus with rates  beats per minute  The average heart rate was 85 beats per minute  Short supraventricular runs were present  There was a short run of accelerated idioventricular rhythm  No ventricular tachycardia was seen  No significant pauses were present  There was some correlation of feeling skipped beats with ectopy but there were other episodes of skipped beats without complaint      Johana Vera MD

## 2022-07-29 ENCOUNTER — HOSPITAL ENCOUNTER (OUTPATIENT)
Dept: NON INVASIVE DIAGNOSTICS | Facility: CLINIC | Age: 47
Discharge: HOME | End: 2022-07-29
Payer: COMMERCIAL

## 2022-07-29 VITALS
HEIGHT: 63 IN | WEIGHT: 178 LBS | BODY MASS INDEX: 31.54 KG/M2 | SYSTOLIC BLOOD PRESSURE: 110 MMHG | HEART RATE: 77 BPM | DIASTOLIC BLOOD PRESSURE: 82 MMHG

## 2022-07-29 DIAGNOSIS — I47.1 SVT (SUPRAVENTRICULAR TACHYCARDIA): ICD-10-CM

## 2022-07-29 DIAGNOSIS — R00.0 TACHYCARDIA: Primary | ICD-10-CM

## 2022-07-29 DIAGNOSIS — R00.2 PALPITATION: ICD-10-CM

## 2022-07-29 DIAGNOSIS — R06.02 SOB (SHORTNESS OF BREATH): ICD-10-CM

## 2022-07-29 LAB
AORTIC ROOT: 2.9 CM
APICAL FOUR CHAMBER EJECTION FRACTION: 59 %
ASCENDING AORTA: 3 CM
AV LVOT PEAK GRADIENT: 4 MMHG
AV PEAK GRADIENT: 6 MMHG
E WAVE DECELERATION TIME: 135 MS
FRACTIONAL SHORTENING: 33 % (ref 28–44)
INTERVENTRICULAR SEPTUM IN DIASTOLE (PARASTERNAL SHORT AXIS VIEW): 1.1 CM
INTERVENTRICULAR SEPTUM: 1.1 CM (ref 0.6–1.1)
LAAS-AP2: 14.7 CM2
LAAS-AP4: 14.6 CM2
LEFT ATRIUM AREA SYSTOLE SINGLE PLANE A4C: 14.6 CM2
LEFT ATRIUM SIZE: 2.9 CM
LEFT INTERNAL DIMENSION IN SYSTOLE: 2.9 CM (ref 2.1–4)
LEFT VENTRICULAR INTERNAL DIMENSION IN DIASTOLE: 4.3 CM (ref 3.5–6)
LEFT VENTRICULAR POSTERIOR WALL IN END DIASTOLE: 0.9 CM
LEFT VENTRICULAR STROKE VOLUME: 52 ML
LVSV (TEICH): 52 ML
MV E'TISSUE VEL-SEP: 9 CM/S
MV PEAK A VEL: 0.8 M/S
MV PEAK E VEL: 71 CM/S
MV STENOSIS PRESSURE HALF TIME: 39 MS
MV VALVE AREA P 1/2 METHOD: 5.64 CM2
RIGHT ATRIAL 2D VOLUME: 33 ML
RIGHT ATRIUM AREA SYSTOLE A4C: 11.9 CM2
RIGHT VENTRICLE ID DIMENSION: 3.7 CM
SL CV LEFT ATRIUM LENGTH A2C: 4.8 CM
SL CV LV EF: 60
SL CV PED ECHO LEFT VENTRICLE DIASTOLIC VOLUME (MOD BIPLANE) 2D: 83 ML
SL CV PED ECHO LEFT VENTRICLE SYSTOLIC VOLUME (MOD BIPLANE) 2D: 32 ML

## 2022-07-29 PROCEDURE — 93306 TTE W/DOPPLER COMPLETE: CPT

## 2022-07-29 PROCEDURE — 93306 TTE W/DOPPLER COMPLETE: CPT | Performed by: INTERNAL MEDICINE

## 2022-07-29 RX ORDER — DILTIAZEM HYDROCHLORIDE 120 MG/1
120 TABLET, FILM COATED ORAL EVERY 12 HOURS SCHEDULED
Qty: 60 TABLET | Refills: 3 | Status: SHIPPED | OUTPATIENT
Start: 2022-07-29 | End: 2022-08-02

## 2022-08-01 ENCOUNTER — OFFICE VISIT (OUTPATIENT)
Dept: CARDIOLOGY CLINIC | Facility: CLINIC | Age: 47
End: 2022-08-01
Payer: COMMERCIAL

## 2022-08-01 ENCOUNTER — HOSPITAL ENCOUNTER (OUTPATIENT)
Dept: MRI IMAGING | Facility: CLINIC | Age: 47
Discharge: HOME | End: 2022-08-01
Payer: COMMERCIAL

## 2022-08-01 VITALS
OXYGEN SATURATION: 98 % | DIASTOLIC BLOOD PRESSURE: 72 MMHG | HEART RATE: 90 BPM | SYSTOLIC BLOOD PRESSURE: 108 MMHG | BODY MASS INDEX: 31.71 KG/M2 | WEIGHT: 179 LBS | RESPIRATION RATE: 16 BRPM | HEIGHT: 63 IN

## 2022-08-01 DIAGNOSIS — H91.8X9 ASYMMETRICAL HEARING LOSS: ICD-10-CM

## 2022-08-01 DIAGNOSIS — H93.13 TINNITUS OF BOTH EARS: ICD-10-CM

## 2022-08-01 DIAGNOSIS — R06.00 DYSPNEA, UNSPECIFIED TYPE: Primary | ICD-10-CM

## 2022-08-01 PROCEDURE — 99214 OFFICE O/P EST MOD 30 MIN: CPT | Performed by: INTERNAL MEDICINE

## 2022-08-01 PROCEDURE — 70553 MRI BRAIN STEM W/O & W/DYE: CPT

## 2022-08-01 PROCEDURE — G1004 CDSM NDSC: HCPCS

## 2022-08-01 PROCEDURE — A9585 GADOBUTROL INJECTION: HCPCS | Performed by: OTOLARYNGOLOGY

## 2022-08-01 RX ORDER — PERPHENAZINE 2 MG
TABLET ORAL
COMMUNITY
Start: 2018-06-26 | End: 2022-10-11

## 2022-08-01 RX ADMIN — GADOBUTROL 8 ML: 604.72 INJECTION INTRAVENOUS at 10:10

## 2022-08-01 NOTE — PROGRESS NOTES
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   (Niels) she has noted palpitations which are quick skips  She had a ZIO patch which showed presence of PVCs and 1 brief run of SVT consisting of 11 beats  When she had symptoms sometimes PVCs were present but at other times a rhythm was normal sinus  No symptoms were noted during the run of SVT  She does walk her dog up to a mi and gets no exertional symptoms of chest discomfort  She does have asthma and sometimes gets short of breath on a hot day walking up hill  She has had no symptoms of lightheadedness or passing out      Patient Active Problem List   Diagnosis    Allergic rhinitis    Mild persistent asthma without complication    Overweight    Palpitation     Past Medical History:   Diagnosis Date    Asthma     Dermatitis     Dyslipidemia, goal LDL below 160     Ovarian cyst 02/2008    Varicella without complication      Social History     Socioeconomic History    Marital status: /Civil Union     Spouse name: Not on file    Number of children: Not on file    Years of education: Not on file    Highest education level: Not on file   Occupational History    Not on file   Tobacco Use    Smoking status: Never Smoker    Smokeless tobacco: Never Used   Vaping Use    Vaping Use: Never used   Substance and Sexual Activity    Alcohol use: Yes     Comment: Occasional    Drug use: Never    Sexual activity: Not on file   Other Topics Concern    Not on file   Social History Narrative    Lives with     Work as a  for [employer]     Financial Resource Strain: Not on file   Food Insecurity: Not on file   Transportation Needs: Not on file   Physical Activity: Not on file   Stress: Not on file   Social Connections: Not on file   Intimate Partner Violence: Not on file   Housing Stability: Not on file      Family History   Problem Relation Age of Onset    Diabetes Father     Hepatitis Father     Stroke Maternal Grandmother     Anemia Maternal Grandmother     Breast cancer Paternal Grandmother     Stroke Paternal Grandfather     Coronary artery disease Paternal Grandfather      Past Surgical History:   Procedure Laterality Date    NO PAST SURGERIES         Current Outpatient Medications:     Arnuity Ellipta 100 MCG/ACT AEPB inhaler, , Disp: , Rfl:     Fish Oil-Cholecalciferol (COROMEGA OMEGA 3+D SQUEEZE PO), , Disp: , Rfl:     loratadine (CLARITIN) 10 mg tablet, Take 10 mg by mouth daily, Disp: , Rfl:     Multiple Vitamins-Minerals (Lutein-Zeaxanthin) TABS, , Disp: , Rfl:     Multiple Vitamins-Minerals (MULTIVITAMIN GUMMIES WOMENS PO), , Disp: , Rfl:     diltiazem (CARDIZEM) 120 MG tablet, Take 1 tablet (120 mg total) by mouth every 12 (twelve) hours (Patient not taking: No sig reported), Disp: 60 tablet, Rfl: 3  No current facility-administered medications for this visit    Allergies   Allergen Reactions    [place] (Diagnostic) - Food Allergy Other (See Comments)    Cat Hair Extract Other (See Comments)    Cephalexin Hives and Other (See Comments)    Dog Epithelium Other (See Comments)    Egg White (Diagnostic) - Food Allergy Other (See Comments)    Penicillins Hives     Other reaction(s): Unknown      Pollen Extract Other (See Comments)    Latex Itching, Rash and Swelling       Labs:  Hospital Outpatient Visit on 07/29/2022   Component Date Value    LA size 07/29/2022 2 9     LVPWd 07/29/2022 0 90     Left Atrium Area-systoli* 07/29/2022 14 7     Left Atrium Area-systoli* 07/29/2022 14 6     MV E' Tissue Velocity Se* 07/29/2022 9     RA 2D Volume 07/29/2022 33 0     IVSd 07/29/2022 0 62     LV DIASTOLIC VOLUME (MOD* 49/96/3470 83     LEFT VENTRICLE SYSTOLIC * 19/12/6512 32     Left ventricular stroke * 07/29/2022 52 00     A4C EF 07/29/2022 59     LA length (A2C) 07/29/2022 4 80     LVIDd 07/29/2022 4 30     IVS 07/29/2022 1 1     LVIDS 07/29/2022 2 90     FS 07/29/2022 33     Asc Ao 07/29/2022 3     Ao root 07/29/2022 2 90     RVID d 07/29/2022 3 7     AV LVOT peak gradient 07/29/2022 4     MV valve area p 1/2 meth* 07/29/2022 5 64     E wave deceleration time 07/29/2022 135     AV peak gradient 07/29/2022 6     MV Peak E Kartik 07/29/2022 71     MV Peak A Kartik 07/29/2022 0 8     MAYI A4C 07/29/2022 14 6     RAA A4C 07/29/2022 11 9     MV stenosis pressure 1/2* 07/29/2022 39     LVSV, 2D 07/29/2022 52     LV EF 07/29/2022 60    Appointment on 07/13/2022   Component Date Value    WBC 07/13/2022 8 19     RBC 07/13/2022 4 34     Hemoglobin 07/13/2022 13 4     Hematocrit 07/13/2022 41 0     MCV 07/13/2022 95     MCH 07/13/2022 30 9     MCHC 07/13/2022 32 7     RDW 07/13/2022 12 8     MPV 07/13/2022 10 5     Platelets 50/51/5880 333     nRBC 07/13/2022 0     Neutrophils Relative 07/13/2022 53     Immat GRANS % 07/13/2022 0     Lymphocytes Relative 07/13/2022 27     Monocytes Relative 07/13/2022 7     Eosinophils Relative 07/13/2022 12 (A)    Basophils Relative 07/13/2022 1     Neutrophils Absolute 07/13/2022 4 39     Immature Grans Absolute 07/13/2022 0 02     Lymphocytes Absolute 07/13/2022 2 19     Monocytes Absolute 07/13/2022 0 61     Eosinophils Absolute 07/13/2022 0 94 (A)    Basophils Absolute 07/13/2022 0 04     Sodium 07/13/2022 138     Potassium 07/13/2022 4 0     Chloride 07/13/2022 107     CO2 07/13/2022 28     ANION GAP 07/13/2022 3 (A)    BUN 07/13/2022 12     Creatinine 07/13/2022 0 80     Glucose, Fasting 07/13/2022 100 (A)    Calcium 07/13/2022 9  0     AST 07/13/2022 22     ALT 07/13/2022 36     Alkaline Phosphatase 07/13/2022 59     Total Protein 07/13/2022 7 8     Albumin 07/13/2022 3 8     Total Bilirubin 07/13/2022 0 53     eGFR 07/13/2022 89     Cholesterol 07/13/2022 242 (A)    Triglycerides 07/13/2022 105     HDL, Direct 07/13/2022 61     LDL Calculated 07/13/2022 160 (A)    Non-HDL-Chol (CHOL-HDL) 07/13/2022 181     TSH 3RD GENERATON 07/13/2022 2 210     Vit D, 25-Hydroxy 07/13/2022 39 7     Estrogen 07/13/2022 211     Progesterone 07/13/2022 0 40     LH 07/13/2022 3 0     FSH 07/13/2022 4 1     Hepatitis C Ab 07/13/2022 Non-reactive     Hemoglobin A1C 07/13/2022 5 1     EAG 07/13/2022 100      Imaging: Echo complete w/ contrast if indicated    Result Date: 7/29/2022  Narrative: Mamta Pall  Left Ventricle: Left ventricular cavity size is normal  Wall thickness is normal  The left ventricular ejection fraction is 60%  Systolic function is normal  Wall motion is normal  Diastolic function is normal    Mitral Valve: There is mild annular calcification    Pericardium: There is a small pericardial effusion anterior to the heart  Review of Systems:  Review of Systems    Physical Exam:  108/72  Heart rate 80, regular  Skin warm dry  Pupils equal   Carotids 2+ without bruits  Lungs clear  Rhythm regular  There is a grade 1 short systolic ejection murmur at the right base  No organomegaly  Good pulses  No edema  EKG is normal    Echo reviewed by me is essentially normal       Discussion/Summary:    1  PVCs, primarily unifocal, sometimes accompanied by symptoms  2  No evidence of cardiac disease  3  Hyperlipidemia with LDL cholesterol 160    Recommendations:    1  Stress test  2  When she gets a little older consider calcium scoring to determine whether or not to add statin therapy  This should also be considered if her LDL cholesterol rises significantly above 160  3   Return 4 weeks       Sofia Alcala MD

## 2022-08-02 DIAGNOSIS — R00.2 PALPITATIONS: ICD-10-CM

## 2022-08-02 DIAGNOSIS — R42 DIZZINESS: ICD-10-CM

## 2022-08-02 DIAGNOSIS — R61 NIGHT SWEATS: ICD-10-CM

## 2022-08-02 DIAGNOSIS — R53.83 FATIGUE, UNSPECIFIED TYPE: Primary | ICD-10-CM

## 2022-08-02 DIAGNOSIS — R63.5 WEIGHT GAIN: ICD-10-CM

## 2022-08-09 ENCOUNTER — APPOINTMENT (OUTPATIENT)
Dept: LAB | Facility: CLINIC | Age: 47
End: 2022-08-09
Payer: COMMERCIAL

## 2022-08-09 DIAGNOSIS — R63.5 WEIGHT GAIN: ICD-10-CM

## 2022-08-09 DIAGNOSIS — R42 DIZZINESS: ICD-10-CM

## 2022-08-09 DIAGNOSIS — R61 NIGHT SWEATS: ICD-10-CM

## 2022-08-09 DIAGNOSIS — R53.83 FATIGUE, UNSPECIFIED TYPE: ICD-10-CM

## 2022-08-09 DIAGNOSIS — R00.2 PALPITATIONS: ICD-10-CM

## 2022-08-09 LAB
T3FREE SERPL-MCNC: 2.72 PG/ML (ref 2.3–4.2)
T4 FREE SERPL-MCNC: 0.88 NG/DL (ref 0.76–1.46)

## 2022-08-09 PROCEDURE — 84481 FREE ASSAY (FT-3): CPT

## 2022-08-09 PROCEDURE — 84402 ASSAY OF FREE TESTOSTERONE: CPT

## 2022-08-09 PROCEDURE — 86800 THYROGLOBULIN ANTIBODY: CPT

## 2022-08-09 PROCEDURE — 84482 T3 REVERSE: CPT

## 2022-08-09 PROCEDURE — 36415 COLL VENOUS BLD VENIPUNCTURE: CPT

## 2022-08-09 PROCEDURE — 83520 IMMUNOASSAY QUANT NOS NONAB: CPT

## 2022-08-09 PROCEDURE — 84479 ASSAY OF THYROID (T3 OR T4): CPT

## 2022-08-09 PROCEDURE — 84439 ASSAY OF FREE THYROXINE: CPT

## 2022-08-09 PROCEDURE — 86376 MICROSOMAL ANTIBODY EACH: CPT

## 2022-08-09 PROCEDURE — 84403 ASSAY OF TOTAL TESTOSTERONE: CPT

## 2022-08-10 LAB
T3RU NFR SERPL: 27 % (ref 24–39)
TESTOST FREE SERPL-MCNC: 4.7 PG/ML (ref 0–4.2)
TESTOST SERPL-MCNC: 13 NG/DL (ref 4–50)
THYROGLOB AB SERPL-ACNC: 1.3 IU/ML (ref 0–0.9)
THYROPEROXIDASE AB SERPL-ACNC: 107 IU/ML (ref 0–34)

## 2022-08-11 LAB — TSH RECEP AB SER-ACNC: <1.1 IU/L (ref 0–1.75)

## 2022-08-12 LAB — T3REVERSE SERPL-MCNC: 9 NG/DL (ref 9.2–24.1)

## 2022-08-17 DIAGNOSIS — R79.89 ABNORMAL THYROID BLOOD TEST: Primary | ICD-10-CM

## 2022-09-07 ENCOUNTER — HOSPITAL ENCOUNTER (OUTPATIENT)
Dept: NON INVASIVE DIAGNOSTICS | Facility: CLINIC | Age: 47
Discharge: HOME | End: 2022-09-07
Payer: COMMERCIAL

## 2022-09-07 VITALS
DIASTOLIC BLOOD PRESSURE: 80 MMHG | SYSTOLIC BLOOD PRESSURE: 115 MMHG | HEART RATE: 103 BPM | BODY MASS INDEX: 31.71 KG/M2 | WEIGHT: 179 LBS | OXYGEN SATURATION: 99 % | HEIGHT: 63 IN

## 2022-09-07 DIAGNOSIS — R06.00 DYSPNEA, UNSPECIFIED TYPE: ICD-10-CM

## 2022-09-07 LAB
BASELINE ST DEPRESSION: 0 MM
MAX HR PERCENT: 94 %
MAX HR: 166 BPM
RATE PRESSURE PRODUCT: NORMAL
SL CV STRESS RECOVERY BP: NORMAL MMHG
SL CV STRESS RECOVERY HR: 97 BPM
SL CV STRESS RECOVERY O2 SAT: 99 %
SL CV STRESS STAGE REACHED: 2
STRESS ANGINA INDEX: 0
STRESS BASELINE BP: NORMAL MMHG
STRESS BASELINE HR: 103 BPM
STRESS DUKE TREADMILL SCORE: 5
STRESS O2 SAT REST: 99 %
STRESS PEAK HR: 166 BPM
STRESS POST ESTIMATED WORKLOAD: 7 METS
STRESS POST EXERCISE DUR MIN: 5 MIN
STRESS POST EXERCISE DUR SEC: 1 SEC
STRESS POST O2 SAT PEAK: 98 %
STRESS POST PEAK BP: 162 MMHG
STRESS ST DEPRESSION: 0 MM

## 2022-09-07 PROCEDURE — 93016 CV STRESS TEST SUPVJ ONLY: CPT | Performed by: INTERNAL MEDICINE

## 2022-09-07 PROCEDURE — 93018 CV STRESS TEST I&R ONLY: CPT | Performed by: INTERNAL MEDICINE

## 2022-09-07 PROCEDURE — 93017 CV STRESS TEST TRACING ONLY: CPT

## 2022-09-09 LAB
CHEST PAIN STATEMENT: NORMAL
MAX DIASTOLIC BP: 84 MMHG
MAX HEART RATE: 166 BPM
MAX PREDICTED HEART RATE: 175 BPM
MAX. SYSTOLIC BP: 162 MMHG
PROTOCOL NAME: NORMAL
TARGET HR FORMULA: NORMAL
TEST INDICATION: NORMAL
TIME IN EXERCISE PHASE: NORMAL

## 2022-09-29 RX ORDER — ALBUTEROL SULFATE 90 UG/1
AEROSOL, METERED RESPIRATORY (INHALATION)
COMMUNITY
Start: 2022-08-23

## 2022-10-03 ENCOUNTER — CONSULT (OUTPATIENT)
Dept: ENDOCRINOLOGY | Age: 47
End: 2022-10-03
Payer: COMMERCIAL

## 2022-10-03 ENCOUNTER — APPOINTMENT (OUTPATIENT)
Dept: LAB | Facility: HOSPITAL | Age: 47
End: 2022-10-03
Payer: COMMERCIAL

## 2022-10-03 VITALS
BODY MASS INDEX: 32.71 KG/M2 | HEIGHT: 63 IN | DIASTOLIC BLOOD PRESSURE: 78 MMHG | SYSTOLIC BLOOD PRESSURE: 120 MMHG | TEMPERATURE: 98.4 F | OXYGEN SATURATION: 97 % | HEART RATE: 102 BPM | WEIGHT: 184.6 LBS

## 2022-10-03 DIAGNOSIS — H53.9 VISION CHANGES: ICD-10-CM

## 2022-10-03 DIAGNOSIS — R41.3 MEMORY LOSS: ICD-10-CM

## 2022-10-03 DIAGNOSIS — H93.19 TINNITUS, UNSPECIFIED LATERALITY: ICD-10-CM

## 2022-10-03 DIAGNOSIS — R68.89 HEAT INTOLERANCE: ICD-10-CM

## 2022-10-03 DIAGNOSIS — M25.579 ANKLE PAIN, UNSPECIFIED CHRONICITY, UNSPECIFIED LATERALITY: ICD-10-CM

## 2022-10-03 DIAGNOSIS — G47.00 INSOMNIA, UNSPECIFIED TYPE: ICD-10-CM

## 2022-10-03 DIAGNOSIS — R41.840 CONCENTRATION DEFICIT: ICD-10-CM

## 2022-10-03 DIAGNOSIS — R00.2 PALPITATIONS: ICD-10-CM

## 2022-10-03 DIAGNOSIS — R42 DIZZINESS: ICD-10-CM

## 2022-10-03 DIAGNOSIS — R53.83 OTHER FATIGUE: ICD-10-CM

## 2022-10-03 DIAGNOSIS — M79.669 CALF PAIN, UNSPECIFIED LATERALITY: ICD-10-CM

## 2022-10-03 DIAGNOSIS — R61 NIGHT SWEATS: ICD-10-CM

## 2022-10-03 DIAGNOSIS — L65.9 HAIR THINNING: ICD-10-CM

## 2022-10-03 DIAGNOSIS — F41.9 ANXIETY: ICD-10-CM

## 2022-10-03 DIAGNOSIS — M62.81 MUSCLE WEAKNESS: ICD-10-CM

## 2022-10-03 DIAGNOSIS — R79.89 ABNORMAL THYROID BLOOD TEST: Primary | ICD-10-CM

## 2022-10-03 DIAGNOSIS — H93.19 TINNITUS, UNSPECIFIED LATERALITY: Primary | ICD-10-CM

## 2022-10-03 LAB
FERRITIN SERPL-MCNC: 29 NG/ML (ref 8–388)
FOLATE SERPL-MCNC: 19.4 NG/ML (ref 3.1–17.5)
IRON SATN MFR SERPL: 28 % (ref 15–50)
IRON SERPL-MCNC: 96 UG/DL (ref 50–170)
MAGNESIUM SERPL-MCNC: 2 MG/DL (ref 1.6–2.6)
TIBC SERPL-MCNC: 339 UG/DL (ref 250–450)
VIT B12 SERPL-MCNC: 694 PG/ML (ref 100–900)

## 2022-10-03 PROCEDURE — 83550 IRON BINDING TEST: CPT

## 2022-10-03 PROCEDURE — 84591 ASSAY OF NOS VITAMIN: CPT

## 2022-10-03 PROCEDURE — 36415 COLL VENOUS BLD VENIPUNCTURE: CPT

## 2022-10-03 PROCEDURE — 82728 ASSAY OF FERRITIN: CPT

## 2022-10-03 PROCEDURE — 82746 ASSAY OF FOLIC ACID SERUM: CPT

## 2022-10-03 PROCEDURE — 82607 VITAMIN B-12: CPT

## 2022-10-03 PROCEDURE — 83540 ASSAY OF IRON: CPT

## 2022-10-03 PROCEDURE — 83735 ASSAY OF MAGNESIUM: CPT

## 2022-10-03 PROCEDURE — 84207 ASSAY OF VITAMIN B-6: CPT

## 2022-10-03 PROCEDURE — 84425 ASSAY OF VITAMIN B-1: CPT

## 2022-10-03 PROCEDURE — 99204 OFFICE O/P NEW MOD 45 MIN: CPT | Performed by: INTERNAL MEDICINE

## 2022-10-03 NOTE — PROGRESS NOTES
Amy Gamez 41 y o  female MRN: [id number]    Encounter: [id number]      Assessment/Plan     Assessment: This is a 41y o -year-old female with   1-increased anti thyroid abs with nl TSH and FT4: These were measured about 2 months ago  She denies having h/o URI symptoms at the time/ neck trauma  No [address] of thyroid diseases or radiation exposure  She is pre menopausal/She has long h/o tinnitus, anxiety, palpitation, inability to lose weight and anxiety  She has firm ? Nodular thyroid lobes and ? Brisk DTRs       Plan:  Thyroid US  Repeat TSH  If the results are WNL no extra action at this time but have her TSH measured at least annually  CC:   Abnormal thyroid test    History of Present Illness     HPI:  See assessment    Review of Systems   Constitutional: Negative for appetite change, diaphoresis and unexpected weight change  HENT: Negative for facial swelling, sore throat, trouble swallowing and voice change  Eyes: Negative for photophobia, pain, itching and visual disturbance  Respiratory: Positive for shortness of breath  Negative for chest tightness  Cardiovascular: Negative for chest pain and palpitations  Gastrointestinal: Negative for constipation, diarrhea, nausea and vomiting  Endocrine: Negative for cold intolerance and heat intolerance  Genitourinary:        Irregualr periods recently   Musculoskeletal: Negative for arthralgias, myalgias and neck pain  Skin: Negative for rash  Neurological: Negative for dizziness, tremors and weakness  Psychiatric/Behavioral: The patient is nervous/anxious          Historical Information   Past Medical History:   Diagnosis Date    Asthma     Dermatitis     Dyslipidemia, goal LDL below 160     Ovarian cyst 02/2008    Varicella without complication      Past Surgical History:   Procedure Laterality Date    NO PAST SURGERIES       Social History   Social History     Substance and Sexual Activity   Alcohol Use Yes    Comment: Occasional Social History     Substance and Sexual Activity   Drug Use Never     Social History     Tobacco Use   Smoking Status Never Smoker   Smokeless Tobacco Never Used     Family History:   Family History   Problem Relation Age of Onset    Diabetes Father     Hepatitis Father     Stroke Maternal Grandmother     Anemia Maternal Grandmother     Breast cancer Paternal Grandmother     Stroke Paternal Grandfather     Coronary artery disease Paternal Grandfather        Meds/Allergies   Current Outpatient Medications   Medication Sig Dispense Refill    albuterol (PROVENTIL HFA,VENTOLIN HFA) 90 mcg/act inhaler 2 PUFFS EVERY 4 HOURS FOR 90 DAYS      Arnuity Ellipta 100 MCG/ACT AEPB inhaler       Fish Oil-Cholecalciferol (COROMEGA OMEGA 3+D SQUEEZE PO)       loratadine (CLARITIN) 10 mg tablet Take 10 mg by mouth daily      Multiple Vitamins-Minerals (MULTIVITAMIN GUMMIES WOMENS PO)       Multiple Vitamins-Minerals (Lutein-Zeaxanthin) TABS  (Patient not taking: Reported on 10/3/2022)       No current facility-administered medications for this visit  Allergies   Allergen Reactions    Antlers (Diagnostic) - Food Allergy Other (See Comments)    Cat Hair Extract Other (See Comments)    Cephalexin Hives and Other (See Comments)    Dog Epithelium Other (See Comments)    Egg White (Diagnostic) - Food Allergy Other (See Comments)    Penicillins Hives     Other reaction(s): Unknown      Pollen Extract Other (See Comments)    Latex Itching, Rash and Swelling       Objective   Vitals: Blood pressure 120/78, pulse 102, temperature 98 4 °F (36 9 °C), temperature source Temporal, height 5' 3" (1 6 m), weight 83 7 kg (184 lb 9 6 oz), SpO2 97 %  Physical Exam  Vitals reviewed  Constitutional:       Appearance: She is obese  HENT:      Head: Normocephalic  Eyes:      Extraocular Movements: Extraocular movements intact  Neck:      Thyroid: Thyromegaly present  Vascular: No carotid bruit     Cardiovascular: Rate and Rhythm: Normal rate and regular rhythm  Pulses: Normal pulses  Heart sounds: Normal heart sounds  No murmur heard  Pulmonary:      Breath sounds: Normal breath sounds  Abdominal:      Palpations: Abdomen is soft  There is no mass  Hernia: No hernia is present  Musculoskeletal:      Comments: Fine tremor   Lymphadenopathy:      Cervical: No cervical adenopathy  Skin:     Findings: No rash  Neurological:      General: No focal deficit present  Mental Status: She is oriented to person, place, and time  Cranial Nerves: No cranial nerve deficit  Sensory: No sensory deficit  Comments: Brisk DTR   Psychiatric:         Mood and Affect: Mood normal          Behavior: Behavior normal          Thought Content: Thought content normal          The history was obtained from the review of the chart, patient  Lab Results:   Lab Results   Component Value Date/Time    TSH 3RD GENERATON 2 210 07/13/2022 08:14 AM    Free T4 0 88 08/09/2022 08:27 AM       Imaging Studies:       I have personally reviewed pertinent reports  Portions of the record may have been created with voice recognition software  Occasional wrong word or "sound a like" substitutions may have occurred due to the inherent limitations of voice recognition software  Read the chart carefully and recognize, using context, where substitutions have occurred

## 2022-10-04 ENCOUNTER — TELEPHONE (OUTPATIENT)
Dept: ENDOCRINOLOGY | Facility: CLINIC | Age: 47
End: 2022-10-04

## 2022-10-04 NOTE — TELEPHONE ENCOUNTER
Pt is requesting a call back regarding the appointment that she had Yesterday with Dr Pasha Noland

## 2022-10-05 ENCOUNTER — TELEPHONE (OUTPATIENT)
Dept: ENDOCRINOLOGY | Age: 47
End: 2022-10-05

## 2022-10-05 ENCOUNTER — HOSPITAL ENCOUNTER (OUTPATIENT)
Dept: ULTRASOUND IMAGING | Facility: CLINIC | Age: 47
Discharge: HOME | End: 2022-10-05
Payer: COMMERCIAL

## 2022-10-05 DIAGNOSIS — R79.89 ABNORMAL THYROID BLOOD TEST: ICD-10-CM

## 2022-10-05 LAB — VIT B6 SERPL-MCNC: 24 UG/L (ref 3.4–65.2)

## 2022-10-05 PROCEDURE — 76536 US EXAM OF HEAD AND NECK: CPT

## 2022-10-06 LAB — VIT B1 BLD-SCNC: 148.7 NMOL/L (ref 66.5–200)

## 2022-10-09 LAB
NIACIN SERPL-MCNC: <5 NG/ML (ref 0–5)
NICOTINAMIDE SERPL-MCNC: 7.6 NG/ML (ref 5.2–72.1)

## 2022-10-10 ENCOUNTER — TELEPHONE (OUTPATIENT)
Dept: ENDOCRINOLOGY | Age: 47
End: 2022-10-10

## 2022-10-10 NOTE — TELEPHONE ENCOUNTER
----- Message from Janene Arriola MD sent at 10/10/2022  7:35 AM EDT -----  Please let her know the thyroid US did not show any nodules/ her blood test is not here yet/ also can you email her the office visit showing that it was mentioned long h/o palpitation,inability to lose weight      Thank you

## 2022-10-10 NOTE — TELEPHONE ENCOUNTER
Left message for patient to let her know that her ultrasound results came back normal - we do not yet have all of her lab results and I am mailing to her the first page of the progress notes per Dr Gali Mendoza request

## 2022-10-11 DIAGNOSIS — R53.83 OTHER FATIGUE: ICD-10-CM

## 2022-10-11 DIAGNOSIS — L65.9 HAIR THINNING: ICD-10-CM

## 2022-10-11 DIAGNOSIS — H53.9 VISION CHANGES: ICD-10-CM

## 2022-10-11 DIAGNOSIS — M25.579 ANKLE PAIN, UNSPECIFIED CHRONICITY, UNSPECIFIED LATERALITY: ICD-10-CM

## 2022-10-11 DIAGNOSIS — G47.00 INSOMNIA, UNSPECIFIED TYPE: ICD-10-CM

## 2022-10-11 DIAGNOSIS — F41.9 ANXIETY: ICD-10-CM

## 2022-10-11 DIAGNOSIS — R00.2 PALPITATIONS: Primary | ICD-10-CM

## 2022-10-11 DIAGNOSIS — M62.81 MUSCLE WEAKNESS: ICD-10-CM

## 2022-10-11 DIAGNOSIS — R61 NIGHT SWEATS: ICD-10-CM

## 2022-10-11 DIAGNOSIS — R42 DIZZINESS: ICD-10-CM

## 2022-10-11 DIAGNOSIS — R68.89 HEAT INTOLERANCE: ICD-10-CM

## 2022-10-11 DIAGNOSIS — M79.669 CALF PAIN, UNSPECIFIED LATERALITY: ICD-10-CM

## 2022-10-11 DIAGNOSIS — R41.840 CONCENTRATION DEFICIT: ICD-10-CM

## 2022-10-11 DIAGNOSIS — R41.3 MEMORY LOSS: ICD-10-CM

## 2022-10-11 DIAGNOSIS — H93.19 TINNITUS, UNSPECIFIED LATERALITY: ICD-10-CM

## 2022-10-11 LAB
MISCELLANEOUS LAB TEST RESULT: NORMAL
MISCELLANEOUS LAB TEST RESULT: NORMAL

## 2022-10-17 ENCOUNTER — APPOINTMENT (OUTPATIENT)
Dept: LAB | Facility: HOSPITAL | Age: 47
End: 2022-10-17
Payer: COMMERCIAL

## 2022-10-17 DIAGNOSIS — H53.9 VISION CHANGES: ICD-10-CM

## 2022-10-17 DIAGNOSIS — L65.9 HAIR THINNING: ICD-10-CM

## 2022-10-17 DIAGNOSIS — R79.89 ABNORMAL THYROID BLOOD TEST: ICD-10-CM

## 2022-10-17 DIAGNOSIS — R00.2 PALPITATIONS: ICD-10-CM

## 2022-10-17 DIAGNOSIS — R42 DIZZINESS: ICD-10-CM

## 2022-10-17 DIAGNOSIS — R53.83 OTHER FATIGUE: ICD-10-CM

## 2022-10-17 DIAGNOSIS — M62.81 MUSCLE WEAKNESS: ICD-10-CM

## 2022-10-17 DIAGNOSIS — H93.19 TINNITUS, UNSPECIFIED LATERALITY: ICD-10-CM

## 2022-10-17 DIAGNOSIS — R61 NIGHT SWEATS: ICD-10-CM

## 2022-10-17 DIAGNOSIS — G47.00 INSOMNIA, UNSPECIFIED TYPE: ICD-10-CM

## 2022-10-17 DIAGNOSIS — R41.3 MEMORY LOSS: ICD-10-CM

## 2022-10-17 DIAGNOSIS — R68.89 HEAT INTOLERANCE: ICD-10-CM

## 2022-10-17 DIAGNOSIS — F41.9 ANXIETY: ICD-10-CM

## 2022-10-17 DIAGNOSIS — M25.579 ANKLE PAIN, UNSPECIFIED CHRONICITY, UNSPECIFIED LATERALITY: ICD-10-CM

## 2022-10-17 DIAGNOSIS — R41.840 CONCENTRATION DEFICIT: ICD-10-CM

## 2022-10-17 DIAGNOSIS — M79.669 CALF PAIN, UNSPECIFIED LATERALITY: ICD-10-CM

## 2022-10-17 LAB
T4 FREE SERPL-MCNC: 0.83 NG/DL (ref 0.76–1.46)
TSH SERPL DL<=0.05 MIU/L-ACNC: 2.41 UIU/ML (ref 0.45–4.5)

## 2022-10-17 PROCEDURE — 84630 ASSAY OF ZINC: CPT

## 2022-10-17 PROCEDURE — 84439 ASSAY OF FREE THYROXINE: CPT

## 2022-10-17 PROCEDURE — 84255 ASSAY OF SELENIUM: CPT

## 2022-10-17 PROCEDURE — 83789 MASS SPECTROMETRY QUAL/QUAN: CPT

## 2022-10-17 PROCEDURE — 84252 ASSAY OF VITAMIN B-2: CPT

## 2022-10-17 PROCEDURE — 84443 ASSAY THYROID STIM HORMONE: CPT

## 2022-10-17 PROCEDURE — 36415 COLL VENOUS BLD VENIPUNCTURE: CPT

## 2022-10-19 DIAGNOSIS — R53.83 OTHER FATIGUE: ICD-10-CM

## 2022-10-19 DIAGNOSIS — L65.9 HAIR THINNING: ICD-10-CM

## 2022-10-19 DIAGNOSIS — R68.89 HEAT INTOLERANCE: ICD-10-CM

## 2022-10-19 DIAGNOSIS — H53.9 VISION CHANGES: ICD-10-CM

## 2022-10-19 DIAGNOSIS — M25.579 ANKLE PAIN, UNSPECIFIED CHRONICITY, UNSPECIFIED LATERALITY: ICD-10-CM

## 2022-10-19 DIAGNOSIS — G47.00 INSOMNIA, UNSPECIFIED TYPE: ICD-10-CM

## 2022-10-19 DIAGNOSIS — R41.840 CONCENTRATION DEFICIT: ICD-10-CM

## 2022-10-19 DIAGNOSIS — M79.669 CALF PAIN, UNSPECIFIED LATERALITY: ICD-10-CM

## 2022-10-19 DIAGNOSIS — R42 DIZZINESS: ICD-10-CM

## 2022-10-19 DIAGNOSIS — R41.3 MEMORY LOSS: ICD-10-CM

## 2022-10-19 DIAGNOSIS — R61 NIGHT SWEATS: ICD-10-CM

## 2022-10-19 DIAGNOSIS — M62.81 MUSCLE WEAKNESS: ICD-10-CM

## 2022-10-19 DIAGNOSIS — H93.19 TINNITUS, UNSPECIFIED LATERALITY: ICD-10-CM

## 2022-10-19 DIAGNOSIS — R00.2 PALPITATIONS: Primary | ICD-10-CM

## 2022-10-19 DIAGNOSIS — F41.9 ANXIETY: ICD-10-CM

## 2022-10-19 LAB
IODINE SERPL-MCNC: 36.8 UG/L (ref 40–92)
SELENIUM SERPL-MCNC: 141 UG/L (ref 93–198)
ZINC SERPL-MCNC: 83 UG/DL (ref 44–115)

## 2022-10-23 LAB — VIT B2 BLD-MCNC: 310 UG/L (ref 137–370)

## 2022-10-24 ENCOUNTER — APPOINTMENT (OUTPATIENT)
Dept: LAB | Facility: CLINIC | Age: 47
End: 2022-10-24
Payer: COMMERCIAL

## 2022-10-24 DIAGNOSIS — F41.9 ANXIETY: ICD-10-CM

## 2022-10-24 DIAGNOSIS — R00.2 PALPITATIONS: ICD-10-CM

## 2022-10-24 DIAGNOSIS — R42 DIZZINESS: ICD-10-CM

## 2022-10-24 DIAGNOSIS — R53.83 OTHER FATIGUE: ICD-10-CM

## 2022-10-24 DIAGNOSIS — L65.9 HAIR THINNING: ICD-10-CM

## 2022-10-24 DIAGNOSIS — H93.19 TINNITUS, UNSPECIFIED LATERALITY: ICD-10-CM

## 2022-10-24 DIAGNOSIS — R41.840 CONCENTRATION DEFICIT: ICD-10-CM

## 2022-10-24 DIAGNOSIS — M62.81 MUSCLE WEAKNESS: ICD-10-CM

## 2022-10-24 DIAGNOSIS — M25.579 ANKLE PAIN, UNSPECIFIED CHRONICITY, UNSPECIFIED LATERALITY: ICD-10-CM

## 2022-10-24 DIAGNOSIS — G47.00 INSOMNIA, UNSPECIFIED TYPE: ICD-10-CM

## 2022-10-24 DIAGNOSIS — H53.9 VISION CHANGES: ICD-10-CM

## 2022-10-24 DIAGNOSIS — M79.669 CALF PAIN, UNSPECIFIED LATERALITY: ICD-10-CM

## 2022-10-24 DIAGNOSIS — R61 NIGHT SWEATS: ICD-10-CM

## 2022-10-24 DIAGNOSIS — R41.3 MEMORY LOSS: ICD-10-CM

## 2022-10-24 DIAGNOSIS — R68.89 HEAT INTOLERANCE: ICD-10-CM

## 2022-10-24 LAB
CORTIS AM PEAK SERPL-MCNC: 14.3 UG/DL (ref 4.2–22.4)
CRP SERPL QL: <3 MG/L

## 2022-10-24 PROCEDURE — 82785 ASSAY OF IGE: CPT

## 2022-10-24 PROCEDURE — 36415 COLL VENOUS BLD VENIPUNCTURE: CPT

## 2022-10-24 PROCEDURE — 86038 ANTINUCLEAR ANTIBODIES: CPT

## 2022-10-24 PROCEDURE — 86140 C-REACTIVE PROTEIN: CPT

## 2022-10-24 PROCEDURE — 86618 LYME DISEASE ANTIBODY: CPT

## 2022-10-24 PROCEDURE — 86430 RHEUMATOID FACTOR TEST QUAL: CPT

## 2022-10-24 PROCEDURE — 82533 TOTAL CORTISOL: CPT

## 2022-10-24 PROCEDURE — 86003 ALLG SPEC IGE CRUDE XTRC EA: CPT

## 2022-10-24 PROCEDURE — 86008 ALLG SPEC IGE RECOMB EA: CPT

## 2022-10-25 LAB
A-LACTALB IGE QN: <0.1 KAU/I
ALMOND IGE QN: 0.19 KUA/I
ANA SPECKLED TITR SER: ABNORMAL {TITER}
ANA TITR SER IF: POSITIVE {TITER}
ARA H6 PEANUT: <0.1 KUA/I
B BURGDOR IGG+IGM SER-ACNC: 0.2 AI
B-LACTOGLOB IGE QN: 0.28 KAU/I
CASEIN IGE QN: <0.1 KAU/I
CASHEW NUT IGE QN: <0.1 KUA/I
CODFISH IGE QN: <0.1 KUA/I
EGG WHITE IGE QN: 0.3 KUA/I
GLUTEN IGE QN: 0.36 KUA/I
HAZELNUT IGE QN: 0.3 KUA/L
MILK IGE QN: 0.38 KUA/I
OVALB IGE QN: 0.28 KAU/I
OVOMUCOID IGE QN: <0.1 KAU/I
PEANUT (RARA H) 1 IGE QN: <0.1 KUA/I
PEANUT (RARA H) 2 IGE QN: <0.1 KUA/I
PEANUT (RARA H) 3 IGE QN: <0.1 KUA/I
PEANUT (RARA H) 8 IGE QN: <0.1 KUA/I
PEANUT (RARA H) 9 IGE QN: <0.1 KUA/I
PEANUT IGE QN: 0.68 KUA/I
RHEUMATOID FACT SER QL LA: NEGATIVE
SALMON IGE QN: <0.1 KUA/I
SCALLOP IGE QN: <0.1 KUA/L
SESAME SEED IGE QN: 0.98 KUA/I
SHRIMP IGE QN: <0.1 KUA/L
SL AMB NOTE:: ABNORMAL
SOYBEAN IGE QN: 0.31 KUA/I
TOTAL IGE SMQN RAST: 151 KU/L (ref 0–113)
TUNA IGE QN: <0.1 KUA/I
WALNUT IGE QN: 0.21 KUA/I
WHEAT IGE QN: 0.58 KUA/I

## 2022-10-26 DIAGNOSIS — R76.8 POSITIVE ANA (ANTINUCLEAR ANTIBODY): Primary | ICD-10-CM

## 2022-10-28 ENCOUNTER — OFFICE VISIT (OUTPATIENT)
Dept: INTERNAL MEDICINE CLINIC | Facility: CLINIC | Age: 47
End: 2022-10-28

## 2022-10-28 VITALS
OXYGEN SATURATION: 100 % | DIASTOLIC BLOOD PRESSURE: 80 MMHG | HEIGHT: 63 IN | TEMPERATURE: 98.8 F | BODY MASS INDEX: 32.64 KG/M2 | WEIGHT: 184.2 LBS | HEART RATE: 76 BPM | SYSTOLIC BLOOD PRESSURE: 140 MMHG

## 2022-10-28 DIAGNOSIS — G47.8 NON-RESTORATIVE SLEEP: ICD-10-CM

## 2022-10-28 DIAGNOSIS — E04.9 GOITER: ICD-10-CM

## 2022-10-28 DIAGNOSIS — R76.8 THYROID ANTIBODY POSITIVE: Primary | ICD-10-CM

## 2022-10-28 DIAGNOSIS — F34.1 DYSTHYMIA: ICD-10-CM

## 2022-10-28 NOTE — ASSESSMENT & PLAN NOTE
· Patient does have thyroid antibodies and thyroid US consistent with this  · Her thyroid function studies including TSH and free T4 are within normal limits     · Had long discussion reviewing her labs and imaging  · At this point, it is unlikely she has an endocrine pathology contributing to her symptoms  · Recommend checking thyroid function studies once yearly, no need for further tests at this time

## 2022-10-28 NOTE — ASSESSMENT & PLAN NOTE
· Patient reports fatigue and some trouble staying asleep  · Reviewed improving sleep hygiene, including not falling asleep on the couch, no screen time at least 1 hour before bed, taking TV out of bedroom, using 3-5mg of melatonin 1-2 hours prior to desired bed time  · May benefit from seeing sleep medicine

## 2022-10-28 NOTE — ASSESSMENT & PLAN NOTE
· Patient does endorse fatigue and decreased motivation and zaira in things she previously found enjoyable  · Has seen a counselor in the past  · Recommended trying to increase exercise

## 2022-10-28 NOTE — PROGRESS NOTES
INTERNAL MEDICINE INITIAL OFFICE VISIT  [facility] - [facility] INTERNAL MEDICINE [place] [facility]    NAME: Carlos Menchaca  AGE: 41 y o  SEX: female  : 1975     DATE: 10/28/2022     Assessment and Plan:     Thyroid antibody positive  · Patient does have thyroid antibodies and thyroid US consistent with this  · Her thyroid function studies including TSH and free T4 are within normal limits  · Had long discussion reviewing her labs and imaging  · At this point, it is unlikely she has an endocrine pathology contributing to her symptoms  · Recommend checking thyroid function studies once yearly, no need for further tests at this time    Non-restorative sleep  · Patient reports fatigue and some trouble staying asleep  · Reviewed improving sleep hygiene, including not falling asleep on the couch, no screen time at least 1 hour before bed, taking TV out of bedroom, using 3-5mg of melatonin 1-2 hours prior to desired bed time  · May benefit from seeing sleep medicine    Dysthymia  · Patient does endorse fatigue and decreased motivation and zaira in things she previously found enjoyable  · Has seen a counselor in the past  · Recommended trying to increase exercise       No follow-ups on file  Chief Complaint:     Chief Complaint   Patient presents with   • Establish Care     Thyroid,       History of Present Illness:     Patient comes in today for a second opinion on thyroid workup  She reports her top concern is fatigue  She says she previously snored, but her  says she does not snore any longer   has not noticed any apnea  Reports she usually watches TV after dinner and falls asleep on the couch until her  wakes her to go to bed around 1030 PM  She then has the TV on in the bedroom while sleeping  She uses lavender spray as a sleep aid, but has not used melatonin  She has not had a sleep study done  She reports she is unable to lose weight   She does eat healthy, with lots of salads and fruits of vegetables  She does not smoke  She occasionally drinks alcohol  Reports that she does not seem to enjoy things she used to any longer  Patient states she her wedding anniversary and an event for her niece are today, but she does not care to celebrate or attend the event  Reports in the past she would be very excited for these events  She also reports lack of motivation to do these events  She previously exercised but no longer finds herself with the energy or motivation to do so  Patient reports she can not swallow pills  She sometimes feels something gets stuck in her throat over the past year but it improves with clearing her throat  She still does get her menses, usually every 27 days or so  There have been a few cycles over the past year that were about 36 days apart  Father has a history of T1DM  The following portions of the patient's history were reviewed and updated as appropriate: allergies, current medications, past family history, past medical history, past social history, past surgical history and problem list      Review of Systems:     Review of Systems   Constitutional: Positive for activity change and fatigue  Negative for appetite change and unexpected weight change  Eyes: Negative for visual disturbance  Respiratory: Negative for shortness of breath  Cardiovascular: Positive for palpitations  Negative for chest pain and leg swelling  Gastrointestinal: Negative for constipation, diarrhea, nausea and vomiting  Endocrine: Negative for polydipsia, polyphagia and polyuria  Musculoskeletal: Negative for arthralgias and myalgias  Neurological: Negative for dizziness, weakness, numbness and headaches  Psychiatric/Behavioral: Positive for dysphoric mood and sleep disturbance  Negative for behavioral problems  The patient is not nervous/anxious           Past Medical History:     Past Medical History:   Diagnosis Date   • Asthma    • Dermatitis    • Dyslipidemia, goal LDL below 160    • Ovarian cyst 02/2008   • Varicella without complication         Past Surgical History:     Past Surgical History:   Procedure Laterality Date   • NO PAST SURGERIES          Social History:   She reports that she has never smoked  She has never used smokeless tobacco  She reports current alcohol use  She reports that she does not use drugs  Family History:     Family History   Problem Relation Age of Onset   • Diabetes Father    • Hepatitis Father    • Stroke Maternal Grandmother    • Anemia Maternal Grandmother    • Breast cancer Paternal Grandmother    • Stroke Paternal Grandfather    • Coronary artery disease Paternal Grandfather         Current Medications:     Current Outpatient Medications:   •  albuterol (PROVENTIL HFA,VENTOLIN HFA) 90 mcg/act inhaler, 2 PUFFS EVERY 4 HOURS FOR 90 DAYS, Disp: , Rfl:   •  Arnuity Ellipta 100 MCG/ACT AEPB inhaler, , Disp: , Rfl:   •  Fish Oil-Cholecalciferol (COROMEGA OMEGA 3+D SQUEEZE PO), , Disp: , Rfl:   •  loratadine (CLARITIN) 10 mg tablet, Take 10 mg by mouth daily, Disp: , Rfl:   •  Multiple Vitamins-Minerals (MULTIVITAMIN GUMMIES WOMENS PO), , Disp: , Rfl:      Allergies: Allergies   Allergen Reactions   • [place] (Diagnostic) - Food Allergy Other (See Comments)   • Cat Hair Extract Other (See Comments)   • Cephalexin Hives and Other (See Comments)   • Dog Epithelium Other (See Comments)   • Egg White (Diagnostic) - Food Allergy Other (See Comments)   • Penicillins Hives     Other reaction(s): Unknown     • Pollen Extract Other (See Comments)   • Latex Itching, Rash and Swelling        Physical Exam:     /80 (BP Location: Left arm, Patient Position: Sitting, Cuff Size: Standard)   Pulse 76   Temp 98 8 °F (37 1 °C) (Tympanic)   Ht 5' 3" (1 6 m)   Wt 83 6 kg (184 lb 3 2 oz)   SpO2 100%   BMI 32 63 kg/m²     Physical Exam  Constitutional:       Appearance: She is obese  She is not ill-appearing     HENT:      Head: Normocephalic and atraumatic  Right Ear: External ear normal       Left Ear: External ear normal       Mouth/Throat:      Mouth: Mucous membranes are moist       Pharynx: Oropharynx is clear  Eyes:      Extraocular Movements: Extraocular movements intact  Neck:      Comments: Thyroid somewhat enlarged on palpation  No nodules or masses  Cardiovascular:      Rate and Rhythm: Normal rate and regular rhythm  Heart sounds: No murmur heard  Pulmonary:      Effort: Pulmonary effort is normal  No respiratory distress  Breath sounds: Normal breath sounds  Abdominal:      General: Abdomen is flat  There is no distension  Palpations: Abdomen is soft  Musculoskeletal:      Right lower leg: No edema  Left lower leg: No edema  Skin:     General: Skin is warm and dry  Neurological:      Mental Status: She is alert and oriented to person, place, and time  Psychiatric:         Mood and Affect: Mood normal          Judgment: Judgment normal           Data:     Laboratory Results: I have personally reviewed the pertinent laboratory results/reports   Radiology/Other Diagnostic Testing Results: I have personally reviewed pertinent reports        Mago Valentin  [facility] INTERNAL MEDICINE [facility]

## 2022-12-01 ENCOUNTER — APPOINTMENT (OUTPATIENT)
Dept: LAB | Facility: CLINIC | Age: 47
End: 2022-12-01

## 2022-12-01 DIAGNOSIS — R76.8 ANA POSITIVE: ICD-10-CM

## 2022-12-01 DIAGNOSIS — M25.50 ARTHRALGIA, UNSPECIFIED JOINT: ICD-10-CM

## 2022-12-02 LAB
ANA SER QL IA: NEGATIVE
C3 SERPL-MCNC: 126 MG/DL (ref 90–180)
C4 SERPL-MCNC: 30 MG/DL (ref 10–40)
CH50 SERPL-ACNC: 59 U/ML
CRP SERPL QL: <3 MG/L
DSDNA AB SER-ACNC: 1 IU/ML (ref 0–9)
ENA SS-A AB SER-ACNC: <0.2 AI (ref 0–0.9)
ENA SS-B AB SER-ACNC: <0.2 AI (ref 0–0.9)

## 2022-12-06 ENCOUNTER — OFFICE VISIT (OUTPATIENT)
Dept: OBGYN CLINIC | Facility: CLINIC | Age: 47
End: 2022-12-06

## 2022-12-06 VITALS
WEIGHT: 184 LBS | DIASTOLIC BLOOD PRESSURE: 82 MMHG | HEIGHT: 63 IN | SYSTOLIC BLOOD PRESSURE: 130 MMHG | BODY MASS INDEX: 32.6 KG/M2

## 2022-12-06 DIAGNOSIS — Z12.31 ENCOUNTER FOR SCREENING MAMMOGRAM FOR MALIGNANT NEOPLASM OF BREAST: ICD-10-CM

## 2022-12-06 DIAGNOSIS — Z01.419 ENCOUNTER FOR ANNUAL ROUTINE GYNECOLOGICAL EXAMINATION: Primary | ICD-10-CM

## 2022-12-06 NOTE — PROGRESS NOTES
Awilda Ibarra  1975    Assessment/Plan: Yearly exam    ASCCP guidelines reviewed  Perineal hygiene reviewed  Kegel exercises recommended  SBE, daily exercise and healthy diet with adequate calcium and vitamin D encouraged  Weight bearing exercises a minimum of 150 minutes/week advised  Yearly mammograms advised  Advised to call with any issues, all concerns & questions addressed  See provided information in your after visit summary     Sharon Del Rio was seen today for gynecologic exam and menstrual problem  Diagnoses and all orders for this visit:    Encounter for annual routine gynecological examination    Encounter for screening mammogram for malignant neoplasm of breast  -     Mammo screening bilateral w 3d & cad; Future       F/U Annually and PRN      Health Maintenance:    Last PAP: 11/24/2021  Results were: Negative cytology  Next PAP Due: 11/24/2024    Last Mammogram: 12/01/2021  Results were: Negative BI-RADS 1  Next Mammogram: due     Last Colonoscopy: Not on file  Cologuard ordered by PCP  Subjective    CC: Yearly Exam     Awilda Ibarra is a 41 y o  female here for an annual exam      Braden Drake  Patient's last menstrual period was 11/16/2022 (exact date)  Sexual activity: She is not sexually active  Contraception: condoms and abstinence  STD testing:  She does not want STD testing today  Non- smoker, social drinker  Exercise- not currently  Her menstrual cycles are irregular every 19-36 days  She is not interested in starting any BC at this time  Her periods are heavy on days 1-2 and moderate to light flow days 3-5  She denies breast concerns, abnormal vaginal discharge, vaginal itching, odor, irritation, bowel/bladder dysfunction, urinary symptoms, pelvic pain, or dyspareunia today  Family hx of breast cancer: Yes - Paternal Grandmother  Family hx of ovarian cancer: No  Family hx of colon cancer: No    Patient's last menstrual period was 11/16/2022 (exact date)      Past Medical History:   Diagnosis Date   • Abnormal Pap smear of cervix     Several years ago   • Asthma    • Dermatitis    • Dyslipidemia, goal LDL below 160    • Ovarian cyst 02/2008   • Varicella without complication      Past Surgical History:   Procedure Laterality Date   • NO PAST SURGERIES         Immunization History   Administered Date(s) Administered   • COVID-19 PFIZER VACCINE 0 3 ML IM 06/25/2021, 07/16/2021   • COVID-19 Pfizer vac (Terry-sucrose, gray cap) 12 yr+ IM 03/25/2022   • Hep B, adult 12/10/2010   • Tdap 12/10/2010, 01/01/2011       Family History   Problem Relation Age of Onset   • Diabetes Father    • Hepatitis Father    • Stroke Maternal Grandmother    • Anemia Maternal Grandmother    • Breast cancer Paternal Grandmother    • Cancer Paternal Grandmother         Breast   • Stroke Paternal Grandfather    • Coronary artery disease Paternal Grandfather      Social History     Tobacco Use   • Smoking status: Never   • Smokeless tobacco: Never   Vaping Use   • Vaping Use: Never used   Substance Use Topics   • Alcohol use:  Yes     Alcohol/week: 2 0 standard drinks     Types: 2 Cans of beer per week     Comment: Social   • Drug use: Never       Current Outpatient Medications:   •  albuterol (PROVENTIL HFA,VENTOLIN HFA) 90 mcg/act inhaler, 2 PUFFS EVERY 4 HOURS FOR 90 DAYS, Disp: , Rfl:   •  Arnuity Ellipta 100 MCG/ACT AEPB inhaler, , Disp: , Rfl:   •  Fish Oil-Cholecalciferol (COROMEGA OMEGA 3+D SQUEEZE PO), , Disp: , Rfl:   •  loratadine (CLARITIN) 10 mg tablet, Take 10 mg by mouth daily, Disp: , Rfl:   •  Multiple Vitamins-Minerals (MULTIVITAMIN GUMMIES WOMENS PO), , Disp: , Rfl:   Patient Active Problem List    Diagnosis Date Noted   • Non-restorative sleep 10/28/2022   • Dysthymia 10/28/2022   • Goiter 10/28/2022   • Thyroid antibody positive 10/26/2022   • Abnormal thyroid blood test 10/03/2022   • Palpitation 06/21/2022   • Mild persistent asthma without complication 07/60/7010   • Overweight 2022   • Allergic rhinitis 03/10/2014       Allergies   Allergen Reactions   • [place] (Diagnostic) - Food Allergy Other (See Comments)   • Cat Hair Extract Other (See Comments)   • Cephalexin Hives and Other (See Comments)   • Dog Epithelium Other (See Comments)   • Egg White (Diagnostic) - Food Allergy Other (See Comments)   • Penicillins Hives     Other reaction(s): Unknown     • Pollen Extract Other (See Comments)   • Latex Itching, Rash and Swelling       OB History    Para Term  AB Living   3 0     3     SAB IAB Ectopic Multiple Live Births   1              # Outcome Date GA Lbr James/2nd Weight Sex Delivery Anes PTL Lv   3 AB            2 AB            1 SAB                Vitals:    22 0806   BP: 130/82   BP Location: Left arm   Patient Position: Sitting   Cuff Size: Standard   Weight: 83 5 kg (184 lb)   Height: 5' 3 3" (1 608 m)     Body mass index is 32 29 kg/m²  Review of Systems   Constitutional: Positive for fatigue  Negative for chills, fever and unexpected weight change  HENT: Negative for congestion  Respiratory: Negative for shortness of breath  Cardiovascular: Negative for chest pain  Gastrointestinal: Negative for abdominal pain, constipation, diarrhea, nausea and vomiting  Endocrine: Negative  Genitourinary: Negative for difficulty urinating, dyspareunia, dysuria, frequency, genital sores, hematuria, menstrual problem, pelvic pain, urgency, vaginal bleeding, vaginal discharge and vaginal pain  Musculoskeletal: Negative for back pain and myalgias  Skin: Negative for pallor and rash  Neurological: Negative for headaches  Hematological: Negative for adenopathy  Psychiatric/Behavioral: Negative for dysphoric mood  All other systems reviewed and are negative  Physical Exam  Constitutional:       General: She is not in acute distress  Appearance: Normal appearance  She is not ill-appearing     Genitourinary:      Bladder and urethral meatus normal       No lesions in the vagina  Right Labia: No rash, tenderness, lesions, skin changes or Bartholin's cyst      Left Labia: No tenderness, lesions, skin changes, Bartholin's cyst or rash  No inguinal adenopathy present in the right or left side  No vaginal discharge, erythema, tenderness, bleeding or ulceration  Right Adnexa: not tender, not full and no mass present  Left Adnexa: not tender, not full and no mass present  No cervical motion tenderness, discharge, friability, lesion, polyp, nabothian cyst, eversion or elongation  Uterus is not enlarged, fixed or tender  No uterine mass detected  Uterus is anteverted  No urethral prolapse, tenderness or discharge present  Bladder is not tender and urgency on palpation not present  Pelvic exam was performed with patient in the lithotomy position  Breasts:     Right: No swelling, inverted nipple, mass, nipple discharge, skin change or tenderness  Left: No swelling, inverted nipple, mass, nipple discharge, skin change or tenderness  HENT:      Head: Normocephalic and atraumatic  Eyes:      Conjunctiva/sclera: Conjunctivae normal    Pulmonary:      Effort: Pulmonary effort is normal    Abdominal:      General: There is no distension  Palpations: Abdomen is soft  Tenderness: There is no abdominal tenderness  Musculoskeletal:         General: Normal range of motion  Cervical back: Neck supple  Lymphadenopathy:      Upper Body:      Right upper body: No supraclavicular or axillary adenopathy  Left upper body: No supraclavicular or axillary adenopathy  Lower Body: No right inguinal adenopathy  No left inguinal adenopathy  Neurological:      Mental Status: She is alert and oriented to person, place, and time  Skin:     General: Skin is warm and dry  Psychiatric:         Mood and Affect: Mood normal          Behavior: Behavior normal          Thought Content:  Thought content normal          Judgment: Judgment normal    Vitals and nursing note reviewed

## 2022-12-06 NOTE — PATIENT INSTRUCTIONS
Breast Self Exam for Women   AMBULATORY CARE:   A breast self-exam (BSE)  is a way to check your breasts for lumps and other changes  Regular BSEs can help you know how your breasts normally look and feel  Most breast lumps or changes are not cancer, but you should always have them checked by a healthcare provider  Why you should do a BSE:  Breast cancer is the most common type of cancer in women  Even if you have mammograms, you may still want to do a BSE regularly  If you know how your breasts normally feel and look, it may help you know when to contact your healthcare provider  Mammograms can miss some cancers  You may find a lump during a BSE that did not show up on a mammogram   When you should do a BSE:  If you have periods, you may want to do your BSE 1 week after your period ends  This is the time when your breasts may be the least swollen, lumpy, or tender  You can do regular BSEs even if you are breastfeeding or have breast implants  Call your doctor if:   You find any lumps or changes in your breasts  You have breast pain or fluid coming from your nipples  You have questions or concerns about your condition or care  How to do a BSE:       Look at your breasts in a mirror  Look at the size and shape of each breast and nipple  Check for swelling, lumps, dimpling, scaly skin, or other skin changes  Look for nipple changes, such as a nipple that is painful or beginning to pull inward  Gently squeeze both nipples and check to see if fluid (that is not breast milk) comes out of them  If you find any of these or other breast changes, contact your healthcare provider  Check your breasts while you sit or  the following 3 positions:    [place] your arms down at your sides  Raise your hands and join them behind your head  Put firm pressure with your hands on your hips  Bend slightly forward while you look at your breasts in the mirror  Lie down and feel your breasts    When you lie down, your breast tissue spreads out evenly over your chest  This makes it easier for you to feel for lumps and anything that may not be normal for your breasts  Do a BSE on one breast at a time  Place a small pillow or towel under your left shoulder  Put your left arm behind your head  Use the 3 middle fingers of your right hand  Use your fingertip pads, on the top of your fingers  Your fingertip pad is the most sensitive part of your finger  Use small circles to feel your breast tissue  Use your fingertip pads to make dime-sized, overlapping circles on your breast and armpits  Use light, medium, and firm pressure  First, press lightly  Second, press with medium pressure to feel a little deeper into the breast  Last, use firm pressure to feel deep within your breast     Examine your entire breast area  Examine the breast area from above the breast to below the breast where you feel only ribs  Make small circles with your fingertips, starting in the middle of your armpit  Make circles going up and down the breast area  Continue toward your breast and all the way across it  Examine the area from your armpit all the way over to the middle of your chest (breastbone)  Stop at the middle of your chest     Move the pillow or towel to your right shoulder, and put your right arm behind your head  Use the 3 fingertip pads of your left hand, and repeat the above steps to do a BSE on your right breast     What else you can do to check for breast problems or cancer:  Talk to your healthcare provider about mammograms  A mammogram is an x-ray of your breasts to screen for breast cancer or other problems  Your provider can tell you the benefits and risks of mammograms  The first mammogram is usually at age 39 or 48  Your provider may recommend you start at 36 or younger if your risk for breast cancer is high  Mammograms usually continue every 1 to 2 years until age 76         Follow up with your doctor as directed:  Write down your questions so you remember to ask them during your visits  © Copyright [employer] 2022 Information is for End User's use only and may not be sold, redistributed or otherwise used for commercial purposes  All illustrations and images included in CareNotes® are the copyrighted property of A D A M , Inc  or Vishnu Del Rosario  The above information is an  only  It is not intended as medical advice for individual conditions or treatments  Talk to your doctor, nurse or pharmacist before following any medical regimen to see if it is safe and effective for you  Wellness Visit for Adults   AMBULATORY CARE:   A wellness visit  is when you see your healthcare provider to get screened for health problems  Your healthcare provider will also give you advice on how to stay healthy  Write down your questions so you remember to ask them  Ask your healthcare provider how often you should have a wellness visit  What happens at a wellness visit:  Your healthcare provider will ask about your health, and your family history of health problems  This includes high blood pressure, heart disease, and cancer  He or she will ask if you have symptoms that concern you, if you smoke, and about your mood  You may also be asked about your intake of medicines, supplements, food, and alcohol  Any of the following may be done: Your weight  will be checked  Your height may also be checked so your body mass index (BMI) can be calculated  Your BMI shows if you are at a healthy weight  Your blood pressure  and heart rate will be checked  Your temperature may also be checked  Blood and urine tests  may be done  Blood tests may be done to check your cholesterol levels  Abnormal cholesterol levels increase your risk for heart disease and stroke  You may also need a blood or urine test to check for diabetes if you are at increased risk  Urine tests may be done to look for signs of an infection or kidney disease      A physical exam  includes checking your heartbeat and lungs with a stethoscope  Your healthcare provider may also check your skin to look for sun damage  Screening tests  may be recommended  A screening test is done to check for diseases that may not cause symptoms  The screening tests you may need depend on your age, gender, family history, and lifestyle habits  For example, colorectal screening may be recommended if you are 48years old or older  Screening tests you need if you are a woman:   A Pap smear  is used to screen for cervical cancer  Pap smears are usually done every 3 to 5 years depending on your age  You may need them more often if you have had abnormal Pap smear test results in the past  Ask your healthcare provider how often you should have a Pap smear  A mammogram  is an x-ray of your breasts to screen for breast cancer  Experts recommend mammograms every 2 years starting at age 48 years  You may need a mammogram at age 52 years or younger if you have an increased risk for breast cancer  Talk to your healthcare provider about when you should start having mammograms and how often you need them  Vaccines you may need:   Get an influenza vaccine  every year  The influenza vaccine protects you from the flu  Several types of viruses cause the flu  The viruses change over time, so new vaccines are made each year  Get a tetanus-diphtheria (Td) booster vaccine  every 10 years  This vaccine protects you against tetanus and diphtheria  Tetanus is a severe infection that may cause painful muscle spasms and lockjaw  Diphtheria is a severe bacterial infection that causes a thick covering in the back of your mouth and throat  Get a human papillomavirus (HPV) vaccine  if you are female and aged 23 to 32 or male 23 to 24 and never received it  This vaccine protects you from HPV infection  HPV is the most common infection spread by sexual contact   HPV may also cause vaginal, penile, and anal cancers  Get a pneumococcal vaccine  if you are aged 72 years or older  The pneumococcal vaccine is an injection given to protect you from pneumococcal disease  Pneumococcal disease is an infection caused by pneumococcal bacteria  The infection may cause pneumonia, meningitis, or an ear infection  Get a shingles vaccine  if you are 60 or older, even if you have had shingles before  The shingles vaccine is an injection to protect you from the varicella-zoster virus  This is the same virus that causes chickenpox  Shingles is a painful rash that develops in people who had chickenpox or have been exposed to the virus  How to eat healthy:  My Plate is a model for planning healthy meals  It shows the types and amounts of foods that should go on your plate  Fruits and vegetables make up about half of your plate, and grains and protein make up the other half  A serving of dairy is included on the side of your plate  The amount of calories and serving sizes you need depends on your age, gender, weight, and height  Examples of healthy foods are listed below:  Eat a variety of vegetables  such as dark green, red, and orange vegetables  You can also include canned vegetables low in sodium (salt) and frozen vegetables without added butter or sauces  Eat a variety of fresh fruits , canned fruit in 100% juice, frozen fruit, and dried fruit  Include whole grains  At least half of the grains you eat should be whole grains  Examples include whole-wheat bread, wheat pasta, brown rice, and whole-grain cereals such as oatmeal     Eat a variety of protein foods such as seafood (fish and shellfish), lean meat, and poultry without skin (turkey and chicken)  Examples of lean meats include pork leg, shoulder, or tenderloin, and beef round, sirloin, tenderloin, and extra lean ground beef  Other protein foods include eggs and egg substitutes, beans, peas, soy products, nuts, and seeds      Choose low-fat dairy products such as skim or 1% milk or low-fat yogurt, cheese, and cottage cheese  Limit unhealthy fats  such as butter, hard margarine, and shortening  Exercise:  Exercise at least 30 minutes per day on most days of the week  Some examples of exercise include walking, biking, dancing, and swimming  You can also fit in more physical activity by taking the stairs instead of the elevator or parking farther away from stores  Include muscle strengthening activities 2 days each week  Regular exercise provides many health benefits  It helps you manage your weight, and decreases your risk for type 2 diabetes, heart disease, stroke, and high blood pressure  Exercise can also help improve your mood  Ask your healthcare provider about the best exercise plan for you  General health and safety guidelines:   Do not smoke  Nicotine and other chemicals in cigarettes and cigars can cause lung damage  Ask your healthcare provider for information if you currently smoke and need help to quit  E-cigarettes or smokeless tobacco still contain nicotine  Talk to your healthcare provider before you use these products  Limit alcohol  A drink of alcohol is 12 ounces of beer, 5 ounces of wine, or 1½ ounces of liquor  Lose weight, if needed  Being overweight increases your risk of certain health conditions  These include heart disease, high blood pressure, type 2 diabetes, and certain types of cancer  Protect your skin  Do not sunbathe or use tanning beds  Use sunscreen with a SPF 15 or higher  Apply sunscreen at least 15 minutes before you go outside  Reapply sunscreen every 2 hours  Wear protective clothing, hats, and sunglasses when you are outside  Drive safely  Always wear your seatbelt  Make sure everyone in your car wears a seatbelt  A seatbelt can save your life if you are in an accident  Do not use your cell phone when you are driving  This could distract you and cause an accident   Pull over if you need to make a call or send a text message  Practice safe sex  Use latex condoms if are sexually active and have more than one partner  Your healthcare provider may recommend screening tests for sexually transmitted infections (STIs)  Wear helmets, lifejackets, and protective gear  Always wear a helmet when you ride a bike or motorcycle, go skiing, or play sports that could cause a head injury  Wear protective equipment when you play sports  Wear a lifejacket when you are on a boat or doing water sports  © Copyright [employer] 2022 Information is for End User's use only and may not be sold, redistributed or otherwise used for commercial purposes  All illustrations and images included in CareNotes® are the copyrighted property of A D A M , Inc  or [facility] Azalea Horne   The above information is an  only  It is not intended as medical advice for individual conditions or treatments  Talk to your doctor, nurse or pharmacist before following any medical regimen to see if it is safe and effective for you

## 2022-12-10 LAB — COLOGUARD RESULT REPORTABLE: NEGATIVE

## 2023-01-26 ENCOUNTER — TELEPHONE (OUTPATIENT)
Dept: OBGYN CLINIC | Facility: CLINIC | Age: 48
End: 2023-01-26

## 2023-01-26 ENCOUNTER — PATIENT MESSAGE (OUTPATIENT)
Dept: OBGYN CLINIC | Facility: CLINIC | Age: 48
End: 2023-01-26

## 2023-01-26 NOTE — TELEPHONE ENCOUNTER
----- Message from Antoine Salamanca sent at 1/26/2023 11:42 AM EST -----  Regarding: Pap results? Contact: [phone]  When will my Pap screening results fro my December appointment be posted to My Chart?

## 2023-01-27 ENCOUNTER — TELEPHONE (OUTPATIENT)
Dept: OBGYN CLINIC | Facility: CLINIC | Age: 48
End: 2023-01-27

## 2023-01-27 NOTE — TELEPHONE ENCOUNTER
Spoke with patient regarding concerns  States she would like a pap smear yearly due to family history  Understands these are not ACOG recommendations, but would feel reassured  States insurance will pay for annual pap smear  Would like a pap smear now and then again with her Annual Exam so she is on schedule  Willing to pay out of pocket  Discussed with manager, ok to schedule pap, no charge to patient

## 2023-01-27 NOTE — TELEPHONE ENCOUNTER
Patient discussed concerns with management  Appt scheduled for Pap only (no charge) 2/8  Annual exam scheduled for 2/24   Noted in visit info, "patient requests pap smear yearly "

## 2023-01-27 NOTE — TELEPHONE ENCOUNTER
----- Message from Kimberly Groves sent at 1/27/2023  8:47 AM EST -----  Regarding: Pap results? Contact: [phone]  That's the problem  I had asked to have one done before my last appointment,  when I scheduled it  And now I find out that it wasn't done  Who's going to pay for it if I have two done this year? My insurance only pays for one per year, not two  Who's going to pay me for the extra time that I need to take off to have a 2nd appointment?

## 2023-01-27 NOTE — TELEPHONE ENCOUNTER
Spoke to Reji Castellano about her concerns, we reviewed the guidelines of why testing was not done  She states she is familiar with but her PCP feels that she needs them every year and feels that provider did not tell her they weren't being done on 12/6  Her paps were normal from [facility] 2015 to 2021 but she states prior to that she had abnormal ones and needs a colpo/bx taken  She is coming in on 2/8/22 for a NO Charge office visit-pap only collection  I have also made her annual for 2/2024 to coordinate her care

## 2023-01-30 ENCOUNTER — HOSPITAL ENCOUNTER (OUTPATIENT)
Dept: MAMMOGRAPHY | Facility: CLINIC | Age: 48
Discharge: HOME | End: 2023-01-30

## 2023-01-30 DIAGNOSIS — Z12.31 ENCOUNTER FOR SCREENING MAMMOGRAM FOR MALIGNANT NEOPLASM OF BREAST: ICD-10-CM

## 2023-04-19 PROBLEM — F34.1 DYSTHYMIA: Status: RESOLVED | Noted: 2022-10-28 | Resolved: 2023-04-19

## 2023-04-19 PROBLEM — E04.9 GOITER: Status: RESOLVED | Noted: 2022-10-28 | Resolved: 2023-04-19

## 2023-04-19 PROBLEM — E66.3 OVERWEIGHT: Status: RESOLVED | Noted: 2022-06-17 | Resolved: 2023-04-19

## 2023-04-19 PROBLEM — G47.8 NON-RESTORATIVE SLEEP: Status: RESOLVED | Noted: 2022-10-28 | Resolved: 2023-04-19

## 2023-05-01 ENCOUNTER — TELEPHONE (OUTPATIENT)
Dept: OBGYN CLINIC | Facility: CLINIC | Age: 48
End: 2023-05-01

## 2023-05-01 NOTE — TELEPHONE ENCOUNTER
----- Message from Diann Boyle sent at 4/29/2023  9:41 AM EDT -----  Regarding: Treatment   Contact: [phone]  I'm not currently having any symptoms  Would I still need treatment? If so, then I guess the 5 day intravaginal would be fine

## 2023-05-02 DIAGNOSIS — B96.89 BV (BACTERIAL VAGINOSIS): Primary | ICD-10-CM

## 2023-05-02 DIAGNOSIS — N76.0 BV (BACTERIAL VAGINOSIS): Primary | ICD-10-CM

## 2023-05-02 RX ORDER — METRONIDAZOLE 7.5 MG/G
1 GEL VAGINAL
Qty: 5 G | Refills: 0 | Status: SHIPPED | OUTPATIENT
Start: 2023-05-02 | End: 2023-05-07

## 2023-06-05 ENCOUNTER — OFFICE VISIT (OUTPATIENT)
Dept: VASCULAR SURGERY | Facility: CLINIC | Age: 48
End: 2023-06-05

## 2023-06-05 VITALS
SYSTOLIC BLOOD PRESSURE: 114 MMHG | RESPIRATION RATE: 18 BRPM | HEART RATE: 72 BPM | HEIGHT: 63 IN | WEIGHT: 185 LBS | BODY MASS INDEX: 32.78 KG/M2 | DIASTOLIC BLOOD PRESSURE: 72 MMHG

## 2023-06-05 DIAGNOSIS — I87.2 VENOUS INSUFFICIENCY: Primary | ICD-10-CM

## 2023-06-05 NOTE — PATIENT INSTRUCTIONS
"- Call the office if you experience any changes to your legs or feet such as new pain, redness or swelling     - Stay active  Exercise everyday  Walking is the recommended exercise with a goal of 30 min 3-4 times a week  A healthy weight can assist in decreasing varicose vein symptoms      - Wear Compression  Put them on in the morning, wear all day and take off before bed at night      -Elevate your legs above the level of your heart  Elevate for 15 minutes 3-4 times a day  -When looking at buying compression, look for \"gradient compression\" with a weight 20-30mmHg (medium weight), knee high is fine   -Try \"[employer]\"    -A good brand is Sigvaris, soft opaque, knee high     "

## 2023-06-05 NOTE — ASSESSMENT & PLAN NOTE
53-year-old female is a self-referral to our office for bilateral leg pain with swelling  Patient complaining of heaviness feeling from the knees to her ankles, left worse than right  No claudication, no rest pain, no wounds     -No testing for review     -Patient has +2 palpable DP and PT pulses   -No bilateral leg discoloration, minimal swelling in b/l ankles  Patient does not have presentation of lymphedema  Recommendations  -Wear compression stockings and return to the office in 3 months for review of symptoms    -No vascular intervention planned at this time   -Discussed pathophysiology of arterial disease and venous disease with patient in detail  No indication for any arterial disease, patient's symptoms are more congruent with venous insufficiency  Recommending compression stockings, RX given today with instructions on use  Discussed with patient leg elevation, increase in exercise, weight loss, and low-sodium diet    -Call the office with any increased leg pain, swelling, discoloration, or wounds

## 2023-06-05 NOTE — PROGRESS NOTES
Assessment/Plan:    Venous insufficiency  61-year-old female is a self-referral to our office for bilateral leg pain with swelling  Patient complaining of heaviness feeling from the knees to her ankles, left worse than right  No claudication, no rest pain, no wounds     -No testing for review     -Patient has +2 palpable DP and PT pulses   -No bilateral leg discoloration, minimal swelling in b/l ankles  Patient does not have presentation of lymphedema  Recommendations  -Wear compression stockings and return to the office in 3 months for review of symptoms    -No vascular intervention planned at this time   -Discussed pathophysiology of arterial disease and venous disease with patient in detail  No indication for any arterial disease, patient's symptoms are more congruent with venous insufficiency  Recommending compression stockings, RX given today with instructions on use  Discussed with patient leg elevation, increase in exercise, weight loss, and low-sodium diet    -Call the office with any increased leg pain, swelling, discoloration, or wounds  Diagnoses and all orders for this visit:    Venous insufficiency  -     Compression Stocking  -     Compression Stocking          Subjective:      Patient ID: Bipin Tran is a 57 y o  female  Patient is new to our practice and was self referred  Pt states that she has always been heavier in her lower body  Pt states that she has calf pain when walking  Pt c/o heaviness and tiredness in her legs  Pt has had this for about 1 year  Pt has swelling in LLE  Pt does not wear compression stockings or elevate her legs  61-year-old female with a past medical history of asthma, palpitation self-referred to our office for lymphedema  She states when she is walking her legs feel heavy from knees down  L>R leg swelling, slightly  Has never had any surgeries on her legs    She reports that she walks every day with her , she currently works from "home and does not have long periods of standing  She denies claudication, rest pain, wounds or tissue loss  Some familial history of varicose veins  She denies smoking  She is not on aspirin or any statins  The following portions of the patient's history were reviewed and updated as appropriate: allergies, current medications, past family history, past medical history, past social history, past surgical history and problem list     Review of Systems   Cardiovascular: Positive for leg swelling  Musculoskeletal:        Leg pain         Objective:      /72 (BP Location: Left arm, Patient Position: Sitting)   Pulse 72   Resp 18   Ht 5' 3\" (1 6 m)   Wt 83 9 kg (185 lb)   BMI 32 77 kg/m²          Physical Exam  Nursing note reviewed  Constitutional:       Appearance: Normal appearance  HENT:      Head: Normocephalic and atraumatic  Cardiovascular:      Rate and Rhythm: Normal rate  Pulses:           Popliteal pulses are 2+ on the right side and 2+ on the left side  Dorsalis pedis pulses are 2+ on the right side and 2+ on the left side  Posterior tibial pulses are 2+ on the right side and 2+ on the left side  Pulmonary:      Effort: Pulmonary effort is normal    Musculoskeletal:         General: No tenderness or signs of injury  Right lower le+ Edema present  Left lower le+ Edema present  Skin:     General: Skin is warm and dry  Capillary Refill: Capillary refill takes less than 2 seconds  Neurological:      General: No focal deficit present  Mental Status: She is alert and oriented to person, place, and time  Psychiatric:         Mood and Affect: Mood normal          Behavior: Behavior normal            I have reviewed and made appropriate changes to the review of systems input by the medical assistant      Vitals:    23 1539   BP: 114/72   BP Location: Left arm   Patient Position: Sitting   Pulse: 72   Resp: 18   Weight: 83 9 kg (185 " "lb)   Height: 5' 3\" (1 6 m)       Patient Active Problem List   Diagnosis   • Allergic rhinitis   • Mild persistent asthma without complication   • Palpitation   • Abnormal thyroid blood test   • Thyroid antibody positive   • Venous insufficiency       Past Surgical History:   Procedure Laterality Date   • NO PAST SURGERIES         Family History   Problem Relation Age of Onset   • No Known Problems Mother    • Diabetes Father    • Hepatitis Father    • No Known Problems Sister    • Stroke Maternal Grandmother    • Anemia Maternal Grandmother    • Breast cancer Paternal Grandmother 72   • Cancer Paternal Grandmother         Breast   • Stroke Paternal Grandfather    • Coronary artery disease Paternal Grandfather    • No Known Problems Maternal Aunt    • No Known Problems Paternal Aunt        Social History     Socioeconomic History   • Marital status: /Civil Union     Spouse name: Not on file   • Number of children: Not on file   • Years of education: Not on file   • Highest education level: Not on file   Occupational History   • Not on file   Tobacco Use   • Smoking status: Never   • Smokeless tobacco: Never   Vaping Use   • Vaping Use: Never used   Substance and Sexual Activity   • Alcohol use:  Yes     Alcohol/week: 2 0 standard drinks of alcohol     Types: 2 Cans of beer per week     Comment: Social   • Drug use: Never   • Sexual activity: Yes     Partners: Male     Birth control/protection: Abstinence, Rhythm, Condom Male   Other Topics Concern   • Not on file   Social History Narrative    Lives with     Work as a  for [employer] Strain: Not on file   Food Insecurity: Not on file   Transportation Needs: Not on file   Physical Activity: Not on file   Stress: Not on file   Social Connections: Not on file   Intimate Partner Violence: Not on file   Housing Stability: Not on file       Allergies   Allergen Reactions   • [place] " (Diagnostic) - Food Allergy Other (See Comments)   • Cat Hair Extract Other (See Comments)   • Cephalexin Hives and Other (See Comments)   • Dog Epithelium Other (See Comments)   • Egg White (Diagnostic) - Food Allergy Other (See Comments)   • Penicillins Hives     Other reaction(s): Unknown     • Pollen Extract Other (See Comments)   • Latex Itching, Rash and Swelling         Current Outpatient Medications:   •  Arnuity Ellipta 100 MCG/ACT AEPB inhaler, , Disp: , Rfl:   •  Fish Oil-Cholecalciferol (COROMEGA OMEGA 3+D SQUEEZE PO), , Disp: , Rfl:   •  loratadine (CLARITIN) 10 mg tablet, Take 10 mg by mouth daily, Disp: , Rfl:   •  Multiple Vitamins-Minerals (MULTIVITAMIN GUMMIES WOMENS PO), , Disp: , Rfl:   •  albuterol (PROVENTIL HFA,VENTOLIN HFA) 90 mcg/act inhaler, 2 PUFFS EVERY 4 HOURS FOR 90 DAYS, Disp: , Rfl:   I have spent a total time of 30 minutes on 06/05/23 in caring for this patient including Instructions for management, Patient and family education, Importance of tx compliance, Risk factor reductions, Documenting in the medical record and Obtaining or reviewing history

## 2023-08-02 ENCOUNTER — PATIENT MESSAGE (OUTPATIENT)
Dept: OBGYN CLINIC | Facility: CLINIC | Age: 48
End: 2023-08-02

## 2023-08-16 ENCOUNTER — OFFICE VISIT (OUTPATIENT)
Dept: OBGYN CLINIC | Facility: MEDICAL CENTER | Age: 48
End: 2023-08-16
Payer: COMMERCIAL

## 2023-08-16 VITALS
SYSTOLIC BLOOD PRESSURE: 122 MMHG | HEIGHT: 63 IN | WEIGHT: 183 LBS | DIASTOLIC BLOOD PRESSURE: 80 MMHG | BODY MASS INDEX: 32.43 KG/M2

## 2023-08-16 DIAGNOSIS — N64.4 BREAST PAIN, RIGHT: Primary | ICD-10-CM

## 2023-08-16 DIAGNOSIS — B36.9 FUNGAL DERMATITIS: ICD-10-CM

## 2023-08-16 PROCEDURE — 99213 OFFICE O/P EST LOW 20 MIN: CPT | Performed by: CLINICAL NURSE SPECIALIST

## 2023-08-16 RX ORDER — CLOTRIMAZOLE AND BETAMETHASONE DIPROPIONATE 10; .64 MG/G; MG/G
CREAM TOPICAL 2 TIMES DAILY
Qty: 15 G | Refills: 0 | Status: SHIPPED | OUTPATIENT
Start: 2023-08-16 | End: 2023-08-30

## 2023-08-16 NOTE — PROGRESS NOTES
Assessment/Plan:       1. Breast pain, right  Assessment & Plan:  Pt c/o one month intermittent breast pain/itching/burning sensation. See HPI for details. Exam essentially WNL- though slight erythema noted along area of concern, no palpable mass. Will check breast imaging with dx mammo/us. Suspect more dermatological fungal growth and will give lotrisone BID x 2 wks. Orders:  -     US breast right limited (diagnostic); Future; Expected date: 08/16/2023  -     Mammo diagnostic right w 3d & cad; Future; Expected date: 08/16/2023    2. Fungal dermatitis  -     clotrimazole-betamethasone (LOTRISONE) 1-0.05 % cream; Apply topically 2 (two) times a day for 14 days          Subjective:      Patient ID: Shannan Humphreys is a 57 y.o. female. She is here for Breast Problem (Patient is having a burning sensation in the lower arm pit area on the right side /Has had the issue about a month /No discharge /)    HPI  Pt c/o one month hx of abnormal R axilla/lateral edge of breast  She is reporting a burning sensation- describes it as between painful and itchy  Is intermittent    Denies trauma   Denies Insect bite that she is aware. No mass/lump palpated by pt. Denies nipple d/c  No c/o left side. Last mammo 1/2023- wnl  Only + FH is PGM wit breast cancer    Menstrual History:  Patient's last menstrual period was 08/11/2023 (exact date). The following portions of the patient's history were reviewed and updated as appropriate: allergies, current medications, past family history, past medical history, past social history, past surgical history, and problem list.    Review of Systems  See HPI for pertinent positives          Objective:    /80 (BP Location: Left arm, Patient Position: Sitting, Cuff Size: Extra-Large)   Ht 5' 3" (1.6 m)   Wt 83 kg (183 lb)   LMP 08/11/2023 (Exact Date)   BMI 32.42 kg/m²      Physical Exam  Constitutional:       General: She is not in acute distress.      Appearance: Normal appearance. Genitourinary:      Genitourinary Comments: Pelvic exam deferred     Breasts:     Breasts are symmetrical.      Right: Skin change present. No mass or nipple discharge. Left: No mass, nipple discharge or skin change. Cardiovascular:      Rate and Rhythm: Normal rate. Pulmonary:      Effort: Pulmonary effort is normal.   Chest:       Abdominal:      General: There is no distension. Palpations: Abdomen is soft. Musculoskeletal:         General: Normal range of motion. Neurological:      Mental Status: She is alert and oriented to person, place, and time. Skin:     General: Skin is warm and dry.    Psychiatric:         Mood and Affect: Mood normal.         Behavior: Behavior normal.

## 2023-08-16 NOTE — ASSESSMENT & PLAN NOTE
Pt c/o one month intermittent breast pain/itching/burning sensation. See HPI for details. Exam essentially WNL- though slight erythema noted along area of concern, no palpable mass. Will check breast imaging with dx mammo/us. Suspect more dermatological fungal growth and will give lotrisone BID x 2 wks.

## 2023-09-26 ENCOUNTER — PATIENT MESSAGE (OUTPATIENT)
Dept: OBGYN CLINIC | Facility: MEDICAL CENTER | Age: 48
End: 2023-09-26

## 2023-09-27 ENCOUNTER — PATIENT MESSAGE (OUTPATIENT)
Dept: OBGYN CLINIC | Facility: MEDICAL CENTER | Age: 48
End: 2023-09-27

## 2023-10-03 ENCOUNTER — HOSPITAL ENCOUNTER (OUTPATIENT)
Dept: ULTRASOUND IMAGING | Facility: CLINIC | Age: 48
Discharge: HOME | End: 2023-10-03
Payer: COMMERCIAL

## 2023-10-03 ENCOUNTER — HOSPITAL ENCOUNTER (OUTPATIENT)
Dept: MAMMOGRAPHY | Facility: CLINIC | Age: 48
Discharge: HOME | End: 2023-10-03
Payer: COMMERCIAL

## 2023-10-03 VITALS — WEIGHT: 180 LBS | BODY MASS INDEX: 31.89 KG/M2 | HEIGHT: 63 IN

## 2023-10-03 DIAGNOSIS — N64.4 BREAST PAIN, RIGHT: ICD-10-CM

## 2023-10-03 PROCEDURE — G0279 TOMOSYNTHESIS, MAMMO: HCPCS

## 2023-10-03 PROCEDURE — 76642 ULTRASOUND BREAST LIMITED: CPT

## 2023-10-03 PROCEDURE — 77065 DX MAMMO INCL CAD UNI: CPT

## 2023-10-24 ENCOUNTER — OFFICE VISIT (OUTPATIENT)
Dept: OBGYN CLINIC | Facility: CLINIC | Age: 48
End: 2023-10-24
Payer: COMMERCIAL

## 2023-10-24 ENCOUNTER — APPOINTMENT (OUTPATIENT)
Dept: RADIOLOGY | Facility: CLINIC | Age: 48
End: 2023-10-24
Payer: COMMERCIAL

## 2023-10-24 VITALS
BODY MASS INDEX: 31.89 KG/M2 | DIASTOLIC BLOOD PRESSURE: 87 MMHG | HEART RATE: 91 BPM | SYSTOLIC BLOOD PRESSURE: 129 MMHG | HEIGHT: 63 IN | WEIGHT: 180 LBS

## 2023-10-24 DIAGNOSIS — M25.511 ACUTE PAIN OF RIGHT SHOULDER: Primary | ICD-10-CM

## 2023-10-24 DIAGNOSIS — M25.511 RIGHT SHOULDER PAIN, UNSPECIFIED CHRONICITY: ICD-10-CM

## 2023-10-24 DIAGNOSIS — S46.911A STRAIN OF RIGHT SHOULDER, INITIAL ENCOUNTER: ICD-10-CM

## 2023-10-24 PROCEDURE — 73030 X-RAY EXAM OF SHOULDER: CPT

## 2023-10-24 PROCEDURE — 99204 OFFICE O/P NEW MOD 45 MIN: CPT | Performed by: ORTHOPAEDIC SURGERY

## 2023-10-24 NOTE — PROGRESS NOTES
Patient Name:  Luke Osler  MRN:  [id number]    Assessment & Plan     1. Acute pain of right shoulder  -     XR shoulder 2+ vw right; Future; Expected date: 10/24/2023  -     Ambulatory Referral to Physical Therapy; Future    2. Strain of right shoulder, initial encounter      Right shoulder strain including biceps and pectoralis major musculature  X-rays reviewed in office today with patient   Strongly recommended nonoperative treatment including outpatient PT and home exercises. Educated patient about shoulder shrugs, weightless rows, and performing ROM in the mirror to assess for symmetry. Advised patient she may take 600mg ibuprofen 3 times daily for 3 days, then take as needed. Can continue to take tylenol as needed. Placed PT script today  Follow up 8 weeks for reevaluation. Will consider further diagnostic imaging if symptoms persist or worsen. Chief Complaint     Right shoulder pain    History of the Present Illness     Luke Osler is a 57 y.o. female with Right shoulder pain since Friday. Patient was leaf blowing and stacking wood; later that day and into the weekend, she started to have significant Right shoulder pain. She admits to pain with overhead movements and Right side lying. She locates her pain to the bicep and anterior chest. She admits to minimal improvement since last week. She has been taking Tylenol and ibuprofen as needed for pain which "took the edge off". Review of Systems     Review of Systems   Constitutional:  Negative for chills and fever. HENT:  Negative for congestion. Respiratory:  Negative for cough, chest tightness and shortness of breath. Cardiovascular:  Negative for chest pain and palpitations. Gastrointestinal:  Negative for abdominal pain. Endocrine: Negative for cold intolerance and heat intolerance. Neurological:  Negative for syncope. Psychiatric/Behavioral:  Negative for confusion.         Physical Exam     /87   Pulse 91   Ht 5' 3" (1.6 m)   Wt 81.6 kg (180 lb)   LMP 08/12/2023   BMI 31.89 kg/m²     Right Shoulder: Active range of motion   140-150 degrees forward flexion with pain at end range  150-160 degrees abduction without pain  80-90 degrees external rotation   1 level restriction internal rotation    There is mild tenderness present over the biceps musculature, proximal biceps tendon, pec minor. Supraspinatus testing 5/5  Infraspinatus testing 5/5  Subscapularis testing 5/5  Leigh test is negative   Latah's test is negative    Speed's test is Negative  The patient is neurovascularly intact distally in the extremity. Eyes:  Anicteric sclerae. Neck:  Supple. Lungs:  Normal respiratory effort. Cardiovascular:  Capillary refill is less than 2 seconds. Skin:  Intact without erythema. Neurologic:  Sensation grossly intact to light touch. Psychiatric:  Mood and affect are appropriate. Data Review     I have personally reviewed pertinent films in PACS, and my interpretation follows:    X-rays taken 10/24/2023 of Right shoulder independently reviewed and demonstrate no acute fracture or dislocation. Small inferior humeral head osteophyte. Well maintained joint spaces.      Past Medical History:   Diagnosis Date    Abnormal Pap smear of cervix     Several years ago    Asthma     Dermatitis     Dyslipidemia, goal LDL below 160     Ovarian cyst 02/2008    Varicella without complication        Past Surgical History:   Procedure Laterality Date    NO PAST SURGERIES         Allergies   Allergen Reactions    Howe (Diagnostic) - Food Allergy Other (See Comments)    Cat Hair Extract Other (See Comments)    Cephalexin Hives and Other (See Comments)    Dog Epithelium Other (See Comments)    Egg White (Diagnostic) - Food Allergy Other (See Comments)    Penicillins Hives     Other reaction(s): Unknown      Pollen Extract Other (See Comments)    Latex Itching, Rash and Swelling       Current Outpatient Medications on File Prior to Visit   Medication Sig Dispense Refill    Arnuity Ellipta 100 MCG/ACT AEPB inhaler       Fish Oil-Cholecalciferol (COROMEGA OMEGA 3+D SQUEEZE PO)       loratadine (CLARITIN) 10 mg tablet Take 10 mg by mouth daily      Multiple Vitamins-Minerals (MULTIVITAMIN GUMMIES WOMENS PO)       [DISCONTINUED] albuterol (PROVENTIL HFA,VENTOLIN HFA) 90 mcg/act inhaler As needed      [DISCONTINUED] clotrimazole-betamethasone (LOTRISONE) 1-0.05 % cream Apply topically 2 (two) times a day for 14 days 15 g 0     No current facility-administered medications on file prior to visit. Social History     Tobacco Use    Smoking status: Never    Smokeless tobacco: Never   Vaping Use    Vaping Use: Never used   Substance Use Topics    Alcohol use:  Yes     Alcohol/week: 2.0 standard drinks of alcohol     Types: 2 Cans of beer per week     Comment: Social    Drug use: Never       Family History   Problem Relation Age of Onset    No Known Problems Mother     Diabetes Father     Hepatitis Father     No Known Problems Sister     Stroke Maternal Grandmother     Anemia Maternal Grandmother     Breast cancer Paternal Grandmother 72    Cancer Paternal Grandmother         Breast    Stroke Paternal Grandfather     Coronary artery disease Paternal Grandfather     No Known Problems Maternal Aunt     No Known Problems Paternal Aunt              Procedures Performed     Procedures  None      Lisa Gorman DO

## 2023-10-26 ENCOUNTER — EVALUATION (OUTPATIENT)
Dept: PHYSICAL THERAPY | Facility: CLINIC | Age: 48
End: 2023-10-26
Payer: COMMERCIAL

## 2023-10-26 DIAGNOSIS — M25.511 ACUTE PAIN OF RIGHT SHOULDER: ICD-10-CM

## 2023-10-26 PROCEDURE — 97162 PT EVAL MOD COMPLEX 30 MIN: CPT

## 2023-10-26 PROCEDURE — 97110 THERAPEUTIC EXERCISES: CPT

## 2023-10-26 NOTE — PROGRESS NOTES
PT Evaluation     Today's date: 10/26/2023  Patient name: Deepthi Beltran  : 1975  MRN: [id number]  Referring provider: Haider Hameed PA-C  Dx:   Encounter Diagnosis     ICD-10-CM    1. Acute pain of right shoulder  M25.511 Ambulatory Referral to Physical Therapy          Start Time: 1015  Stop Time: 1045  Total time in clinic (min): 30 minutes    Assessment  Assessment details: Patient is a 57 y.o. female who presents to physical therapy with c/o R shoulder pain. Patient presents to evaluation with pain, decreased range of motion, decreased strength, and decreased tolerance to activity. Patient demonstrates good tolerance to treatment and was provided with a written copy of their initial home exercise program focusing on strengthening scapular stabilizers and stretching and was encouraged to perform daily per tolerance. I discussed risks, benefits, and alternatives to treatment, and answered all patient questions to patient satisfaction. Patient presents with baseline FOTO score of 62 indicating limited tolerance/ability to complete ADLs. Patient is an appropriate candidate for skilled PT and would benefit from skilled PT services to address the aforementioned impairments, achieve goals, maximize function, and improve quality of life. Pt is in agreement with this plan.     Patient Education: activity modifications as needed, pacing of activities, importance of HEP compliance, PT prognosis/POC    Impairments: abnormal or restricted ROM, activity intolerance, impaired physical strength and pain with function    Goals  ST weeks  Pt will demonstrate good understanding and compliance with HEP  Pt will decrease pain to 5/10 with activity    Pt will demonstrate improved postural awareness and ability to self-correct without reliance on external cues     LT weeks  Pt will improve FOTO score to > or = to 74 to indicate improved functional abilities   Pt will decrease pain to 0-1/10 with activity   Pt will increase shoulder strength to 5/5 for improved tolerance/independence with ADLs  Pt will increase shoulder flexion/abduction to 170 degrees to increase independence with ADLs   Pt will increase functional IR to be able to don/doff bra/belt and wash back without pain         Plan  Patient would benefit from: PT eval and skilled physical therapy  Planned modality interventions: cryotherapy, TENS and thermotherapy: hydrocollator packs  Planned therapy interventions: graded activity, gait training, functional ROM exercises, flexibility, graded exercise, graded motor, home exercise program, stretching, strengthening, therapeutic activities, therapeutic exercise, therapeutic training and manual therapy  Frequency: 2x week  Plan of Care beginning date: 10/26/2023  Plan of Care expiration date: 2023  Treatment plan discussed with: patient        Subjective Evaluation    History of Present Illness  Mechanism of injury: Pt is a 56 y/o female who presents to therapy with c/o of R shoulder pain. Pt reports she was leaf blowing on Friday and Saturday she was stacking firewood which she thinks may have caused the pain. Pt reports she is taking Advil as recommended by the orthopedic that seems to help with pain. She notes the pain has improved since the initial injury. When she first hurt the shoulder she was barely able to move the arm and this lasted 3-4 days. Pt notes difficulty with reaching, reaching behind the back, sleeping, working, and other ADLs. Pt reports no numbness/tingling in the arm.    Patient Goals  Patient goals for therapy: decreased pain and independence with ADLs/IADLs    Pain  Current pain ratin  At best pain ratin  At worst pain rating: 10  Quality: burning and pulling  Relieving factors: medications and ice        Objective     General Comments:      Shoulder Comments   Posture: forward head, rounded shoulders    Cervical ROM:  Flex- 35  Ext- 55  L rot-55  R rot-55   L lateral flex- 20  R lateral flex- 20     Shoulder AROM:  Flex-130 pain   Abd- 120 pain  Er- C6   Ir- L2    Shoulder PROM:  Flex- WNL with pain at end range  Abd- WNL with pain at end range  Er- WNL  Ir- WNL     Shoulder strength:  Flex- 4/5  Abd-4-/5  Er-4/5  Ir-4/5    Sensation: intact/symmetrical     Hawkin's-Familia: (+)  Empty can test: (-)              Diagnosis: R shoulder pain   Precautions: (-)   POC Expires: 12/21/23   Re-evaluation Date: 11/23/23   FOTO Scores/Date: Goal -74; 10/26-62   Visit Count 1/10       Manuals                                Ther Ex        UT stretch HEP       LS stretch HEP       Scapular retraction HEP                                                       Neuro Re-Ed                                                               Ther Act                                                                                 Modalities

## 2023-10-30 ENCOUNTER — OFFICE VISIT (OUTPATIENT)
Dept: PHYSICAL THERAPY | Facility: CLINIC | Age: 48
End: 2023-10-30
Payer: COMMERCIAL

## 2023-10-30 DIAGNOSIS — M25.511 ACUTE PAIN OF RIGHT SHOULDER: Primary | ICD-10-CM

## 2023-10-30 PROCEDURE — 97140 MANUAL THERAPY 1/> REGIONS: CPT

## 2023-10-30 PROCEDURE — 97110 THERAPEUTIC EXERCISES: CPT

## 2023-10-30 NOTE — PROGRESS NOTES
Daily Note     Today's date: 10/30/2023  Patient name: Prema Adhikari  : 1975  MRN: [id number]  Referring provider: Mason Marcus PA-C  Dx:   Encounter Diagnosis     ICD-10-CM    1. Acute pain of right shoulder  M25.511           Start Time: 0848  Stop Time: 0930  Total time in clinic (min): 42 minutes    Subjective: Patient reports that her shoulder has been pain free over the weekend. Patient rates 0/10 pain today. Objective: See treatment diary below      Assessment: Tolerated treatment well. First session after initial evaluation. Patient participated in skilled PT session focused on strengthening, stretching, and ROM. Patient able to complete exercise program pain free. Patient did experience some mild pain during PROM flexion and IR at end range. Some v.c. for proper technique with exercises. Patient would continue to benefit from skilled PT interventions to address strengthening, stretching, and ROM. Patient demonstrated fatigue post treatment      Plan: Continue per plan of care.       Diagnosis: R shoulder pain   Precautions: (-)   POC Expires: 23   Re-evaluation Date: 23   FOTO Scores/Date: Goal -74; 10/26-62   Visit Count 1/10 2/10      Manuals  10/30/2023        PROM R shoulder  10 min                      Ther Ex        UT stretch HEP       LS stretch HEP       Scapular retraction HEP 5" 2x10        UBE 10 min alt every 2 min        Wall slides Flex/Scap 10" 10x ea        RTB B/L ER 2x10        AAROM w/cane  Flex and abd 2x10 ea                      Neuro Re-Ed                                                               Ther Act                                                                                 Modalities

## 2024-01-26 DIAGNOSIS — Z12.31 ENCOUNTER FOR SCREENING MAMMOGRAM FOR MALIGNANT NEOPLASM OF BREAST: Primary | ICD-10-CM

## 2024-03-14 ENCOUNTER — OFFICE VISIT (OUTPATIENT)
Age: 49
End: 2024-03-14
Payer: COMMERCIAL

## 2024-03-14 VITALS
WEIGHT: 180 LBS | HEIGHT: 63 IN | SYSTOLIC BLOOD PRESSURE: 142 MMHG | HEART RATE: 80 BPM | BODY MASS INDEX: 31.89 KG/M2 | DIASTOLIC BLOOD PRESSURE: 86 MMHG

## 2024-03-14 DIAGNOSIS — J45.30 MILD PERSISTENT ASTHMA WITHOUT COMPLICATION: Primary | ICD-10-CM

## 2024-03-14 DIAGNOSIS — R73.01 IMPAIRED FASTING GLUCOSE: ICD-10-CM

## 2024-03-14 DIAGNOSIS — Z13.6 SCREENING FOR CARDIOVASCULAR CONDITION: ICD-10-CM

## 2024-03-14 DIAGNOSIS — E01.0 THYROMEGALY: ICD-10-CM

## 2024-03-14 DIAGNOSIS — R76.8 THYROID ANTIBODY POSITIVE: ICD-10-CM

## 2024-03-14 DIAGNOSIS — R53.83 OTHER FATIGUE: ICD-10-CM

## 2024-03-14 PROBLEM — R79.89 ABNORMAL THYROID BLOOD TEST: Status: RESOLVED | Noted: 2022-10-03 | Resolved: 2024-03-14

## 2024-03-14 PROBLEM — N64.4 BREAST PAIN, RIGHT: Status: RESOLVED | Noted: 2023-08-16 | Resolved: 2024-03-14

## 2024-03-14 PROBLEM — R00.2 PALPITATION: Status: RESOLVED | Noted: 2022-06-21 | Resolved: 2024-03-14

## 2024-03-14 PROBLEM — I87.2 VENOUS INSUFFICIENCY: Status: RESOLVED | Noted: 2023-06-05 | Resolved: 2024-03-14

## 2024-03-14 PROCEDURE — 99214 OFFICE O/P EST MOD 30 MIN: CPT | Performed by: INTERNAL MEDICINE

## 2024-03-14 NOTE — PROGRESS NOTES
INTERNAL MEDICINE OFFICE VISIT  [facility] - [facility] PRIMARY [facility]ARE [place]    NAME: Rebecca Ferrara  AGE: 48 y.o. SEX: female  : 1975     DATE: 3/14/2024     Assessment and Plan:     1. Mild persistent asthma without complication    Stable at this time. Doing well on Arnuity.    2. Thyromegaly  3. Thyroid antibody positive    Reviewed prior thyroid studies. No discrete nodule felt on exam. Will not repeat thyroid US at this time. Check updated thyroid studies.    - TSH, 3rd generation; Future  - T3; Future  - T4, free; Future    4. Other fatigue    Suspect due to element of stress, not exercising, dysthymia. Possible sleep apnea. Would consider testing depending on lab results. Monitor blood pressure at home.    - Lyme Total AB W Reflex to IGM/IGG; Future  - CBC and differential; Future  - Comprehensive metabolic panel; Future  - High sensitivity CRP (hs-CRP); Future  - ROCÍO Screen w/ Reflex to Titer/Pattern; Future    5. Screening for cardiovascular condition  - Lipid Panel with Direct LDL reflex; Future    6. Impaired fasting glucose  - Hemoglobin A1C; Future       Depression Screening and Follow-up Plan: Patient was screened for depression during today's encounter. They screened negative with a PHQ-2 score of 0.       Return in about 1 year (around 3/15/2025) for Annual Physical.     History of Present Illness:     Rebecca presents for follow-up. Was seeing Dr. Diaz in the past, but was unable to make an appt on [facility] due to her availability.     She has had + thyroid antibodies in the past without evidence of active thyroid disease. No auto-immune history that she is aware of. Did have a positive ROCÍO in the past but then on repeat, it was negative. US thyroid did not show discrete nodule.    The following portions of the patient's history were reviewed and updated as appropriate: allergies, current medications, past family history, past medical history, past social history, past  surgical history and problem list.     Review of Systems:     Review of Systems   Constitutional:  Positive for fatigue. Negative for chills and fever.   HENT:  Negative for voice change.    Respiratory: Negative.     Cardiovascular: Negative.    Gastrointestinal: Negative.    Endocrine:        Hair thinning   Psychiatric/Behavioral:  Positive for sleep disturbance.       Past Medical History:     Past Medical History:   Diagnosis Date    Abnormal Pap smear of cervix     Several years ago    Asthma     Dermatitis     Dyslipidemia, goal LDL below 160     Ovarian cyst 02/2008    Varicella without complication       Past Surgical History:     Past Surgical History:   Procedure Laterality Date    NO PAST SURGERIES        Social History:     Social History     Socioeconomic History    Marital status: /Civil Union     Spouse name: None    Number of children: None    Years of education: None    Highest education level: None   Occupational History    None   Tobacco Use    Smoking status: Never    Smokeless tobacco: Never   Vaping Use    Vaping status: Never Used   Substance and Sexual Activity    Alcohol use: Yes     Alcohol/week: 2.0 standard drinks of alcohol     Types: 2 Cans of beer per week     Comment: Social    Drug use: Never    Sexual activity: Not Currently     Partners: Male     Birth control/protection: Abstinence, Rhythm, Condom Male   Other Topics Concern    None   Social History Narrative    Lives with     Work as a  for non-profit      Social Determinants of Health     Financial Resource Strain: Not on file   Food Insecurity: No Food Insecurity (6/19/2021)    Received from [facility]    Hunger Vital Sign     Worried About Running Out of Food in the Last Year: Never true     Ran Out of Food in the Last Year: Never true   Transportation Needs: Not on file   Physical Activity: Not on file   Stress: Not on file   Social Connections: Not on file   Intimate Partner Violence: Not on  "file   Housing Stability: Not on file       Family History:     Family History   Problem Relation Age of Onset    No Known Problems Mother     Diabetes Father     Hepatitis Father         Hep C    No Known Problems Sister     Stroke Maternal Grandmother     Anemia Maternal Grandmother     Breast cancer Paternal Grandmother 65    Cancer Paternal Grandmother         Breast    Stroke Paternal Grandfather     Coronary artery disease Paternal Grandfather     No Known Problems Maternal Aunt     No Known Problems Paternal Aunt       Current Medications:     Current Outpatient Medications:     Arnuity Ellipta 100 MCG/ACT AEPB inhaler, , Disp: , Rfl:     Fish Oil-Cholecalciferol (COROMEGA OMEGA 3+D SQUEEZE PO), , Disp: , Rfl:     loratadine (CLARITIN) 10 mg tablet, Take 10 mg by mouth daily, Disp: , Rfl:     Multiple Vitamins-Minerals (MULTIVITAMIN GUMMIES WOMENS PO), , Disp: , Rfl:      Allergies:     Allergies   Allergen Reactions    [place] (Diagnostic) - Food Allergy Other (See Comments)    Cat Hair Extract Other (See Comments)    Cephalexin Hives and Other (See Comments)    Dog Epithelium Other (See Comments)    Egg White (Diagnostic) - Food Allergy Other (See Comments)    Penicillins Hives     Other reaction(s): Unknown      Pollen Extract Other (See Comments)    Latex Itching, Rash and Swelling      Physical Exam:     /86   Pulse 80   Ht 5' 3\" (1.6 m)   Wt 81.6 kg (180 lb)   BMI 31.89 kg/m²     Physical Exam  Constitutional:       General: She is not in acute distress.     Appearance: She is not ill-appearing.   Neck:      Thyroid: Thyromegaly present. No thyroid mass or thyroid tenderness.      Vascular: No carotid bruit.   Cardiovascular:      Rate and Rhythm: Normal rate and regular rhythm.      Heart sounds: No murmur heard.  Pulmonary:      Effort: Pulmonary effort is normal. No respiratory distress.      Breath sounds: No wheezing.   Abdominal:      General: Bowel sounds are normal. There is no " distension.      Tenderness: There is no abdominal tenderness.   Musculoskeletal:      Cervical back: No tenderness.      Right lower leg: No edema.      Left lower leg: No edema.   Neurological:      Mental Status: She is alert.        Douglas Barrera DO  [facility]

## 2024-04-25 ENCOUNTER — HOSPITAL ENCOUNTER (OUTPATIENT)
Age: 49
Discharge: HOME | End: 2024-04-25
Payer: COMMERCIAL

## 2024-04-25 DIAGNOSIS — Z12.31 ENCOUNTER FOR SCREENING MAMMOGRAM FOR MALIGNANT NEOPLASM OF BREAST: ICD-10-CM

## 2024-04-25 PROCEDURE — 77063 BREAST TOMOSYNTHESIS BI: CPT

## 2024-04-25 PROCEDURE — 77067 SCR MAMMO BI INCL CAD: CPT

## 2024-04-29 ENCOUNTER — TELEPHONE (OUTPATIENT)
Dept: OBGYN CLINIC | Facility: CLINIC | Age: 49
End: 2024-04-29

## 2024-04-29 ENCOUNTER — TELEPHONE (OUTPATIENT)
Dept: OBGYN CLINIC | Facility: MEDICAL CENTER | Age: 49
End: 2024-04-29

## 2024-04-29 NOTE — TELEPHONE ENCOUNTER
Please let the patient know that Tia was/is out of the office. Her mammogram shows an area with some asymmetry that the radiologist would like to get a better look at. The orders for the follow up imaging are in. She will be in good hand with the breast center for the necessary follow up.

## 2024-04-29 NOTE — TELEPHONE ENCOUNTER
----- Message from Madison Gao RN sent at 4/29/2024 12:13 PM EDT -----  Regarding: FW: Mammogram results   Contact: [phone]    ----- Message -----  From: Rebecca Ferrara  Sent: 4/29/2024   9:25 AM EDT  To: [facility]  Subject: Mammogram results                                Please reach out to me as soon as possible regarding the results of yesterday's mammogram.  I suffer from anxiety and I'm concerned about what the test result notes say.

## 2024-04-29 NOTE — TELEPHONE ENCOUNTER
----- Message from Elen Ramirez sent at 4/26/2024  2:16 PM EDT -----  Regarding: FW: 4/25 mammogram results   Contact: [phone]    ----- Message -----  From: Rebecca Ferrara  Sent: 4/26/2024   1:50 PM EDT  To: [facility]  Subject: 4/25 mammogram results                           Please reach out to me as soon as possible regarding the results of yesterday's mammogram.  I suffer from anxiety and I'm concerned about what the test result notes say. And central scheduling is refusing to scheduling the following tests mentioned in the Test results comments.

## 2024-04-29 NOTE — TELEPHONE ENCOUNTER
Return call from Rebecca, reviewed Dr. Escobedo' recommendation. Rebecca expressed her sincere frustration with mammogram process. Her time is limited to call to schedule and take time from work to f/u and is also concerned about additional cost for f/u imaging.    She is requesting phone# to call to voice her concern.    Unable to locate patient advocate # while on call.  Rebecca is in agreement to receive c/b tomorrow and provided approval to leave detailed message on her cell v/m.

## 2024-05-09 NOTE — PROGRESS NOTES
Diagnoses and all orders for this visit:    Encounter for gynecological examination without abnormal finding  -     Liquid-based pap, screening    Encounter for screening mammogram for malignant neoplasm of breast  -     Mammo screening bilateral w 3d & cad; Future    Colon cancer screening        Perineal hygiene reviewed   Weight bearing exercises minium of 150 mins/weekly advised.   Kegel exercises recommended daily, see AVS for instructions and recommendations  SBE encouraged, ASCCP guidelines reviewed. Condoms encouraged with all sexual activity to prevent STI's.   Gardisil vaccines recommended up to age 45  Calcium/ Vit D dietary requirements discussed,   Advised to call with any issues,  all concerns & questions addressed.   See after visit summary for further information and recommendations to the above mentioned subjects which we may or may not have covered in detail during your visit   F/U Annually and PRN      Health Maintenance:    Last PAP: 2023 Neg/Neg, pt requests today d/t anxiety . We discussed ASCCP guidelines   Next PAP Due:    Last Mammogram: 2024 , needs diagnostic imaging for left breast asymmetry   Life time Neil Johnson % 19.53, Density C heterogeneously dense , Bi-Rads 1 Negative  Next Mammogram: order given    Last Colonoscopy: Not on file    Cologuard  Neg repeat in 3 years     Gardisil: Not completed       Subjective    CC: Yearly Exam      Rebecca Ferrara is a 48 y.o. female here for an annual exam.   GYN hx includes:  2 SAB, abnormal Pap in the past, vaginitis    No personal Hx of breast, cervical, ovarian or colon CA.   Family hx of: PGM with BC  Medically stable, reports no changes in medical Hx, follows with PMD, has pending wellness labs to be drawn  Pt has anxiety over her health     Patient's last menstrual period was 2024 (approximate).  Her menstrual cycles are becoming irregular every 21-57 days.  She reports varying flow bleeding during her  menses. Flow can be frustrating at times   Reports history of abnormal pap smear 10 years ago   She denies breast concerns, abnormal vaginal discharge, vaginal itching, odor, irritation, bowel/bladder dysfunction, urinary symptoms, pelvic pain, or dyspareunia today.   She is sexually active. Monogamous relationship.  Her current method of contraception includes  condoms. Denies any issues with her BCM.  She does not want STD testing today.  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    Past Medical History:   Diagnosis Date    Abnormal Pap smear of cervix     Several years ago    Asthma     Dermatitis     Dyslipidemia, goal LDL below 160     Ovarian cyst 02/2008    Varicella without complication     3 times     Past Surgical History:   Procedure Laterality Date    NO PAST SURGERIES         Immunization History   Administered Date(s) Administered    COVID-19 PFIZER VACCINE 0.3 ML IM 06/25/2021, 07/16/2021    COVID-19 Pfizer vac (Terry-sucrose, gray cap) 12 yr+ IM 03/25/2022    Hep B, adult 12/10/2010    Tdap 12/10/2010, 01/01/2011       Family History   Problem Relation Age of Onset    No Known Problems Mother     Diabetes Father     Hepatitis Father         Hep C    No Known Problems Sister     Stroke Maternal Grandmother     Anemia Maternal Grandmother     Breast cancer Paternal Grandmother 65    Cancer Paternal Grandmother         Breast    Stroke Paternal Grandfather     Coronary artery disease Paternal Grandfather     No Known Problems Maternal Aunt     No Known Problems Paternal Aunt      Social History     Tobacco Use    Smoking status: Never    Smokeless tobacco: Never   Vaping Use    Vaping status: Never Used   Substance Use Topics    Alcohol use: Yes     Alcohol/week: 2.0 standard drinks of alcohol     Types: 2 Cans of beer per week     Comment: Social    Drug use: Never       Current Outpatient Medications:     Arnuity Ellipta 100 MCG/ACT AEPB inhaler, ,  "Disp: , Rfl:     Fish Oil-Cholecalciferol (COROMEGA OMEGA 3+D SQUEEZE PO), , Disp: , Rfl:     loratadine (CLARITIN) 10 mg tablet, Take 10 mg by mouth daily, Disp: , Rfl:     Multiple Vitamins-Minerals (MULTIVITAMIN GUMMIES WOMENS PO), , Disp: , Rfl:   Patient Active Problem List    Diagnosis Date Noted    Thyroid antibody positive 10/26/2022    Mild persistent asthma without complication 2022    Allergic rhinitis 03/10/2014       Allergies   Allergen Reactions    [place]anga (Diagnostic) - Food Allergy Other (See Comments)    Cat Hair Extract Other (See Comments)    Cephalexin Hives and Other (See Comments)    Dog Epithelium Other (See Comments)    Egg White (Diagnostic) - Food Allergy Other (See Comments)    Penicillins Hives     Other reaction(s): Unknown      Pollen Extract Other (See Comments)    Latex Itching, Rash and Swelling       OB History    Para Term  AB Living   2 0 0   2 0   SAB IAB Ectopic Multiple Live Births   2       0      # Outcome Date GA Lbr James/2nd Weight Sex Delivery Anes PTL Lv   2 SAB            1 SAB               Obstetric Comments   2 SAB        Vitals:    05/10/24 0831   BP: 112/74   BP Location: Left arm   Patient Position: Sitting   Cuff Size: Large   Weight: 79.8 kg (176 lb)   Height: 5' 3\" (1.6 m)     Body mass index is 31.18 kg/m².    Review of Systems     Constitutional: Negative for chills, fatigue, fever, headaches, visual disturbances, and unexpected weight change.   Respiratory: Negative for cough, & shortness of breath.  Cardiovascular: Negative for chest pain. .    Gastrointestinal: Negative for Abd pain, nausea & vomiting, constipation and diarrhea.   Genitourinary: Negative for difficulty urinating, dysuria, hematuria,  unusual vaginal bleeding or discharge  Skin: Negative skin changes    Physical Exam     Constitutional: Alert & Oriented x3, well-developed and well-nourished. No distress.   HENT: Atraumatic, Normocephalic, Conjunctivae clear  Neck: Normal " range of motion. Neck supple. No thyromegaly, mass, nodules or tenderness  Pulmonary: Effort normal.   Abdominal: Soft. No tenderness or masses  Musculoskeletal: Normal ROM  Skin: Warm & Dry  Psychological: Normal mood, thought content, behavior & judgement     Breasts:   Right: tissue soft without masses, tenderness, skin changes or nipple discharge. No areas of erythema or pain. No subclavicular, axillary, pectoral adenopathy  Left:  tissue soft without masses, tenderness, skin changes or nipple discharge. No areas of erythema or pain. No subclavicular, axillary, pectoral adenopathy    Pelvic exam was performed with patient supine, lithotomy position.      Labia: Negative rash, tenderness, lesion or injury on the right labia.              Negative rash, tenderness, lesion or injury on the left labia.   Urethral meatus:  Negative for  tenderness, inflammation or discharge.   Uterus: not deviated, enlarged, fixed or tender.   Cervix: No CMT, no discharge or friability. + menses  Right adnexa: no mass, no tenderness and no fullness.  Left adnexa: no mass, no tenderness and no fullness.   Vagina: No erythema, tenderness, masses, or foreign body in the vagina. No signs of injury around the vagina. No unusual vaginal discharge   Perineum without lesions, signs of injury, erythema or swelling.  Inguinal Canal:        Right: No inguinal adenopathy or hernia present.        Left: No inguinal adenopathy or hernia present.

## 2024-05-09 NOTE — PATIENT INSTRUCTIONS
Prophylactic NSAID therapy for Painful or Heavy menses     Ibuprofen or Naproxen (chose 1 or the other, do not take both), Dose as noted on the box. Typically Ibuprofen dose is 600 mg, (3 tablets) every 6-8 hours. Typically Naproxen dose is 500 mg every 12 hours.  Start taking medication 2 days prior to onset of menses and continue taking through the first 3 days of menses. Make sure you take consistently this is important  You need to take with food to decrease any gastrointestinal upset effects    This is proven therapy to reduce you flow and cramping by 50 %    Life style changes that have a positive effect on painful and heavy periods are as follows   Daily physical exercise    Increase fiber, fresh fruits and vegetables in your diet    Increase daily water intake    Heating pads(do not apply directly to skin, apply over clothing or towel)   Warm Baths   Relaxation techniques, meditation, massage, yoga and mindfulness     These are all suggestion for improving your sense of frustrations with your menstrual cycle and improving your overall wellness and lifestyle  Breast Self Exam for Women   AMBULATORY CARE:   A breast self-exam (BSE)  is a way to check your breasts for lumps and other changes. Regular BSEs can help you know how your breasts normally look and feel. Most breast lumps or changes are not cancer, but you should always have them checked by a healthcare provider.  Why you should do a BSE:  Breast cancer is the most common type of cancer in women. Even if you have mammograms, you may still want to do a BSE regularly. If you know how your breasts normally feel and look, it may help you know when to contact your healthcare provider. Mammograms can miss some cancers. You may find a lump during a BSE that did not show up on a mammogram.  When you should do a BSE:  If you have periods, you may want to do your BSE 1 week after your period ends. This is the time when your breasts may be the least swollen,  lumpy, or tender. You can do regular BSEs even if you are breastfeeding or have breast implants.  Call your doctor if:   You find any lumps or changes in your breasts.    You have breast pain or fluid coming from your nipples.    You have questions or concerns about your condition or care.    How to do a BSE:       Look at your breasts in a mirror.  Look at the size and shape of each breast and nipple. Check for swelling, lumps, dimpling, scaly skin, or other skin changes. Look for nipple changes, such as a nipple that is painful or beginning to pull inward. Gently squeeze both nipples and check to see if fluid (that is not breast milk) comes out of them. If you find any of these or other breast changes, contact your healthcare provider. Check your breasts while you sit or  the following 3 positions:    Hang your arms down at your sides.    Raise your hands and join them behind your head.    Put firm pressure with your hands on your hips. Bend slightly forward while you look at your breasts in the mirror.    Lie down and feel your breasts.  When you lie down, your breast tissue spreads out evenly over your chest. This makes it easier for you to feel for lumps and anything that may not be normal for your breasts. Do a BSE on one breast at a time.    Place a small pillow or towel under your left shoulder.  Put your left arm behind your head.    Use the 3 middle fingers of your right hand.  Use your fingertip pads, on the top of your fingers. Your fingertip pad is the most sensitive part of your finger.    Use small circles to feel your breast tissue.  Use your fingertip pads to make dime-sized, overlapping circles on your breast and armpits. Use light, medium, and firm pressure. First, press lightly. Second, press with medium pressure to feel a little deeper into the breast. Last, use firm pressure to feel deep within your breast.    Examine your entire breast area.  Examine the breast area from above the  breast to below the breast where you feel only ribs. Make small circles with your fingertips, starting in the middle of your armpit. Make circles going up and down the breast area. Continue toward your breast and all the way across it. Examine the area from your armpit all the way over to the middle of your chest (breastbone). Stop at the middle of your chest.    Move the pillow or towel to your right shoulder, and put your right arm behind your head.  Use the 3 fingertip pads of your left hand, and repeat the above steps to do a BSE on your right breast.  What else you can do to check for breast problems or cancer:  Talk to your healthcare provider about mammograms. A mammogram is an x-ray of your breasts to screen for breast cancer or other problems. Your provider can tell you the benefits and risks of mammograms. The first mammogram is usually at age 45 or 50. Your provider may recommend you start at 40 or younger if your risk for breast cancer is high. Mammograms usually continue every 1 to 2 years until age 74.       Follow up with your doctor as directed:  Write down your questions so you remember to ask them during your visits.  © Copyright [employer] 2023 Information is for End User's use only and may not be sold, redistributed or otherwise used for commercial purposes.  The above information is an  only. It is not intended as medical advice for individual conditions or treatments. Talk to your doctor, nurse or pharmacist before following any medical regimen to see if it is safe and effective for you.  Wellness Visit for Adults   AMBULATORY CARE:   A wellness visit  is when you see your healthcare provider to get screened for health problems. Your healthcare provider will also give you advice on how to stay healthy. Write down your questions so you remember to ask them. Ask your healthcare provider how often you should have a wellness visit.  What happens at a wellness visit:  Your healthcare  provider will ask about your health, and your family history of health problems. This includes high blood pressure, heart disease, and cancer. He or she will ask if you have symptoms that concern you, if you smoke, and about your mood. You may also be asked about your intake of medicines, supplements, food, and alcohol. Any of the following may be done:  Your weight  will be checked. Your height may also be checked so your body mass index (BMI) can be calculated. Your BMI shows if you are at a healthy weight.    Your blood pressure  and heart rate will be checked. Your temperature may also be checked.    Blood and urine tests  may be done. Blood tests may be done to check your cholesterol levels. Abnormal cholesterol levels increase your risk for heart disease and stroke. You may also need a blood or urine test to check for diabetes if you are at increased risk. Urine tests may be done to look for signs of an infection or kidney disease.    A physical exam  includes checking your heartbeat and lungs with a stethoscope. Your healthcare provider may also check your skin to look for sun damage.    Screening tests  may be recommended. A screening test is done to check for diseases that may not cause symptoms. The screening tests you may need depend on your age, gender, family history, and lifestyle habits. For example, colorectal screening may be recommended if you are 50 years old or older.    Screening tests you need if you are a woman:   A Pap smear  is used to screen for cervical cancer. Pap smears are usually done every 3 to 5 years depending on your age. You may need them more often if you have had abnormal Pap smear test results in the past. Ask your healthcare provider how often you should have a Pap smear.    A mammogram  is an x-ray of your breasts to screen for breast cancer. Experts recommend mammograms every 2 years starting at age 50 years. You may need a mammogram at age 49 years or younger if you have an  increased risk for breast cancer. Talk to your healthcare provider about when you should start having mammograms and how often you need them.    Vaccines you may need:   Get an influenza vaccine  every year. The influenza vaccine protects you from the flu. Several types of viruses cause the flu. The viruses change over time, so new vaccines are made each year.    Get a tetanus-diphtheria (Td) booster vaccine  every 10 years. This vaccine protects you against tetanus and diphtheria. Tetanus is a severe infection that may cause painful muscle spasms and lockjaw. Diphtheria is a severe bacterial infection that causes a thick covering in the back of your mouth and throat.    Get a human papillomavirus (HPV) vaccine  if you are female and aged 19 to 26 or male 19 to 21 and never received it. This vaccine protects you from HPV infection. HPV is the most common infection spread by sexual contact. HPV may also cause vaginal, penile, and anal cancers.    Get a pneumococcal vaccine  if you are aged 65 years or older. The pneumococcal vaccine is an injection given to protect you from pneumococcal disease. Pneumococcal disease is an infection caused by pneumococcal bacteria. The infection may cause pneumonia, meningitis, or an ear infection.    Get a shingles vaccine  if you are 60 or older, even if you have had shingles before. The shingles vaccine is an injection to protect you from the varicella-zoster virus. This is the same virus that causes chickenpox. Shingles is a painful rash that develops in people who had chickenpox or have been exposed to the virus.    How to eat healthy:  My Plate is a model for planning healthy meals. It shows the types and amounts of foods that should go on your plate. Fruits and vegetables make up about half of your plate, and grains and protein make up the other half. A serving of dairy is included on the side of your plate. The amount of calories and serving sizes you need depends on your age,  gender, weight, and height. Examples of healthy foods are listed below:  Eat a variety of vegetables  such as dark green, red, and orange vegetables. You can also include canned vegetables low in sodium (salt) and frozen vegetables without added butter or sauces.    Eat a variety of fresh fruits , canned fruit in 100% juice, frozen fruit, and dried fruit.    Include whole grains.  At least half of the grains you eat should be whole grains. Examples include whole-wheat bread, wheat pasta, brown rice, and whole-grain cereals such as oatmeal.    Eat a variety of protein foods such as seafood (fish and shellfish), lean meat, and poultry without skin (turkey and chicken). Examples of lean meats include pork leg, shoulder, or tenderloin, and beef round, sirloin, tenderloin, and extra lean ground beef. Other protein foods include eggs and egg substitutes, beans, peas, soy products, nuts, and seeds.    Choose low-fat dairy products such as skim or 1% milk or low-fat yogurt, cheese, and cottage cheese.    Limit unhealthy fats  such as butter, hard margarine, and shortening.       Exercise:  Exercise at least 30 minutes per day on most days of the week. Some examples of exercise include walking, biking, dancing, and swimming. You can also fit in more physical activity by taking the stairs instead of the elevator or parking farther away from stores. Include muscle strengthening activities 2 days each week. Regular exercise provides many health benefits. It helps you manage your weight, and decreases your risk for type 2 diabetes, heart disease, stroke, and high blood pressure. Exercise can also help improve your mood. Ask your healthcare provider about the best exercise plan for you.       General health and safety guidelines:   Do not smoke.  Nicotine and other chemicals in cigarettes and cigars can cause lung damage. Ask your healthcare provider for information if you currently smoke and need help to quit. E-cigarettes or  smokeless tobacco still contain nicotine. Talk to your healthcare provider before you use these products.    Limit alcohol.  A drink of alcohol is 12 ounces of beer, 5 ounces of wine, or 1½ ounces of liquor.    Lose weight, if needed.  Being overweight increases your risk of certain health conditions. These include heart disease, high blood pressure, type 2 diabetes, and certain types of cancer.    Protect your skin.  Do not sunbathe or use tanning beds. Use sunscreen with a SPF 15 or higher. Apply sunscreen at least 15 minutes before you go outside. Reapply sunscreen every 2 hours. Wear protective clothing, hats, and sunglasses when you are outside.    Drive safely.  Always wear your seatbelt. Make sure everyone in your car wears a seatbelt. A seatbelt can save your life if you are in an accident. Do not use your cell phone when you are driving. This could distract you and cause an accident. Pull over if you need to make a call or send a text message.    Practice safe sex.  Use latex condoms if are sexually active and have more than one partner. Your healthcare provider may recommend screening tests for sexually transmitted infections (STIs).    Wear helmets, lifejackets, and protective gear.  Always wear a helmet when you ride a bike or motorcycle, go skiing, or play sports that could cause a head injury. Wear protective equipment when you play sports. Wear a lifejacket when you are on a boat or doing water sports.    © Copyright [employer] 2023 Information is for End User's use only and may not be sold, redistributed or otherwise used for commercial purposes.  The above information is an  only. It is not intended as medical advice for individual conditions or treatments. Talk to your doctor, nurse or pharmacist before following any medical regimen to see if it is safe and effective for you.  Kegel Exercises for Women   AMBULATORY CARE:   Kegel exercises  help strengthen your pelvic muscles. Pelvic  muscles hold your pelvic organs, such as your bladder and uterus, in place. Kegel exercises help prevent or control certain conditions, such as urine incontinence (leakage) or uterine prolapse.       Call your doctor or physical therapist if:   You cannot feel your muscles tighten or relax.    You continue to leak urine.    You have questions or concerns about your condition or care.    Use the correct muscles:  Pelvic muscles are the muscles you use to control urine flow. To target these muscles, stop and start the flow of urine several times. This will help you become familiar with how it feels to tighten and relax these muscles.  How to do Kegel exercises:   Get into a comfortable position.  You may lie down, stand up, or sit down to do these exercises. When you first try to do these exercises, it may be easier if you lie down.    Tighten or squeeze your pelvic muscles slowly.  It may feel like you are trying to hold back urine or gas. Hold this position for 3 seconds. Relax for 3 seconds. Repeat this cycle 10 times. Do not hold your breath when you do Kegel exercises. Keep your stomach, back, and leg muscles relaxed.    Do 10 sets of Kegel exercises, at least 3 times a day.  When you know how to do Kegel exercises, use different positions. This will help to strengthen your pelvic muscles as much as possible. You can do these exercises while you lie on the floor, watch TV, or while you stand. Tighten your pelvic muscles before you sneeze, cough, or lift to prevent urine leakage. You may notice improved bladder control within about 6 weeks.    Follow up with your doctor or physical therapist as directed:  Write down your questions so you remember to ask them during your visits.  © Copyright [employer] 2023 Information is for End User's use only and may not be sold, redistributed or otherwise used for commercial purposes.  The above information is an  only. It is not intended as medical advice for  individual conditions or treatments. Talk to your doctor, nurse or pharmacist before following any medical regimen to see if it is safe and effective for you.       Perineal Hygiene      Your vaginal naturally takes care of its self, it is a self washing system, the less you mess the healthier it will be     No soaps or feminine wash to the vulva, these products can cause dermitis, bacterial infections and other vulvar problems.   Use only water to cleanse, or water with Dove or Dove Sensitive Skin Bar soap if necessary.    No scented lotions or products are advised in or near your vulva.    Use only coconut oil for moisture if needed.  No douching this may cause imbalance in your vaginal PH and further issues.    If you wear panty liners, you may apply a thin coating of Vaseline, A&D ointment or coconut oil to the vulvar tissues as a skin barrier     Cotton underware, loose fitting clothing  Only perfume-free, dye-free laundry detergent, use a second rinse cycle   Avoid fabric softeners/dryer sheets.       Your partner should avoid the same products as well.       Over the counter probiotic to restore vaginal karan may be helpful as well, take daily.       You may also look into Boric Acid vaginal suppositories to restore vaginal PH balance for up to 2 weeks as directed on the box. You may not use these if you are pregnant      For vaginal dryness:      You may use:     Coconut oil (organic, pure, unscented) as needed for moisture or lubrication. ( Do not use if allergic)       Replens moisture restore external comfort gel daily ( use as directed on the box)        Replens long lasting vaginal moisturizer  ( use as directed on the box)         For Vaginal Lubrication:          You may use:     Coconut oil (organic, pure, unscented) as a lubricant or another scent-free lubricant (Astroglide, Uberlube) if needed.  Do not use coconut oil or silicone if using a condom as this may break down the integrity of the condom  and cause an unplanned pregnancy              Do not use coconut oil if allergic               Replens silky smooth lubricant, premium silicone based lubricant for intercourse. ( use as directed, a small amount will provide an enhanced natural feeling)     Any premium over the counter vaginal lubricant water or silicone based. Silicone based will have more staying power.     Menopause   WHAT YOU NEED TO KNOW:   What is menopause?  Menopause is a normal stage in a person's life when monthly periods stop. You are considered to be in menopause when you have not had a period for a full year after the age of 40. Menopause usually occurs between ages 47 to 53. Perimenopause is a stage before menopause that may cause signs and symptoms similar to menopause. Perimenopause may start about 4 years before menopause.       What causes menopause?  Menopause starts when the ovaries stop making the female hormones estrogen and progesterone. After menopause, you are no longer able to become pregnant. Any of the following may trigger menopause or early menopause:  Surgery, including a hysterectomy or oophorectomy    Family history of early menopause    Smoking    Chemotherapy or pelvic radiation    Chromosome abnormalities, including Spence syndrome and Fragile X syndrome    Premature ovarian insufficiency (the ovaries stop producing eggs before age 40)    What are the signs and symptoms of menopause?   Irregular menstrual cycles with heavy vaginal bleeding followed by decreased bleeding until it stops    Hot flashes (feeling warm, flushed, and sweaty)    Vaginal changes such as increased dryness    Mood changes such as anxiety, depression, or decreased desire to have sex    Trouble sleeping, joint pain, headaches    Brittle nails, hair on chin or chest where it is normally absent    Decrease in breast size and change in skin texture    Weight gain    How is menopause treated or managed?   Hormone replacement therapy (HRT) is  medicine that replaces your low hormone levels.  HRT contains estrogen and sometimes progestin.    HRT has several benefits.  HRT helps prevent osteoporosis, which decreases your risk for bone fractures. HRT also protects you from colorectal cancer.    HRT also has some risks.  HRT increases your risk for breast cancer, blood clots, heart disease, a heart attack, or a stroke. If you are 65 years or older, HRT can also increase your risk for dementia. Your risk for uterine or endometrial cancer, gallbladder disease, and urinary incontinence is higher if you take estrogen-only HRT.    Manage hot flashes.  Hot flashes are brief periods of feeling very warm, flushed, and sweaty. Hot flashes can last from a few seconds to several minutes. They may happen many times during the day, and are common at night. Layer your clothing so that you can easily remove some clothing and cool yourself during a hot flash. Cold drinks may also be helpful. Non-hormone medicines can help relieve or prevent hot flashes. Examples include certain antidepressants, nerve medicines, and high blood pressure medicines.    Reduce vaginal dryness by using over-the-counter vaginal creams.  Vaginal dryness may cause you to have pain or discomfort during sex. Only use creams that are made for vaginal use. Do  not  use petroleum jelly. You may put an estrogen cream in and around your vagina. Estrogen cream may help decrease vaginal dryness and lower your risk of vaginal infections.    Continue to use birth control during perimenopause if you do not want to get pregnant.  You may need to use birth control until it has been 1 year since your periods stopped. Ask your healthcare provider when you can stop using birth control to prevent pregnancy.    How can I live a healthy lifestyle during and after menopause?  After menopause, your risk for heart disease and bone loss increases. Ask about these and other ways to stay healthy:  Exercise regularly.   Exercise helps you maintain a healthy weight. Exercise can also help to control your blood pressure and cholesterol levels. Include weight-bearing exercise for strong bones. Weight bearing exercise is recommended for at least 30 minutes, 3 times a week. Ask your healthcare provider about the best exercise plan for you.         Eat a variety of healthy foods.  Include fruits, vegetables, whole grains (whole-wheat bread, pasta, and cereals), low-fat dairy, and lean protein foods (beans, poultry, and fish). Limit foods high in sodium (salt). Ask your healthcare provider for more information about a meal plan that is right for you.         Do not smoke.  If you smoke, it is never too late to quit. You are more likely to have a heart attack, lung disease, blood clots, and cancer if you smoke. Ask your healthcare provider for information if you need help quitting.    Take supplements as directed.  You may need extra calcium and vitamin D to help prevent osteoporosis.            Limit alcohol and caffeine.  Alcohol and caffeine may worsen your symptoms.    When should I call my doctor?   You have vaginal bleeding after menopause.    You have questions or concerns about your condition or care.    CARE AGREEMENT:   You have the right to help plan your care. Learn about your health condition and how it may be treated. Discuss treatment options with your healthcare providers to decide what care you want to receive. You always have the right to refuse treatment. The above information is an  only. It is not intended as medical advice for individual conditions or treatments. Talk to your doctor, nurse or pharmacist before following any medical regimen to see if it is safe and effective for you.  © Copyright [employer] 2023 Information is for End User's use only and may not be sold, redistributed or otherwise used for commercial purposes.

## 2024-05-10 ENCOUNTER — APPOINTMENT (OUTPATIENT)
Dept: LAB | Facility: HOSPITAL | Age: 49
End: 2024-05-10
Payer: COMMERCIAL

## 2024-05-10 ENCOUNTER — ANNUAL EXAM (OUTPATIENT)
Dept: OBGYN CLINIC | Facility: CLINIC | Age: 49
End: 2024-05-10
Payer: COMMERCIAL

## 2024-05-10 VITALS
BODY MASS INDEX: 31.18 KG/M2 | SYSTOLIC BLOOD PRESSURE: 112 MMHG | HEIGHT: 63 IN | DIASTOLIC BLOOD PRESSURE: 74 MMHG | WEIGHT: 176 LBS

## 2024-05-10 DIAGNOSIS — Z12.11 COLON CANCER SCREENING: ICD-10-CM

## 2024-05-10 DIAGNOSIS — Z01.419 ENCOUNTER FOR GYNECOLOGICAL EXAMINATION WITHOUT ABNORMAL FINDING: Primary | ICD-10-CM

## 2024-05-10 DIAGNOSIS — Z13.6 SCREENING FOR CARDIOVASCULAR CONDITION: ICD-10-CM

## 2024-05-10 DIAGNOSIS — R53.83 OTHER FATIGUE: ICD-10-CM

## 2024-05-10 DIAGNOSIS — R76.8 THYROID ANTIBODY POSITIVE: ICD-10-CM

## 2024-05-10 DIAGNOSIS — R73.01 IMPAIRED FASTING GLUCOSE: ICD-10-CM

## 2024-05-10 DIAGNOSIS — E01.0 THYROMEGALY: ICD-10-CM

## 2024-05-10 DIAGNOSIS — Z12.31 ENCOUNTER FOR SCREENING MAMMOGRAM FOR MALIGNANT NEOPLASM OF BREAST: ICD-10-CM

## 2024-05-10 LAB
ALBUMIN SERPL BCP-MCNC: 4.1 G/DL (ref 3.5–5)
ALP SERPL-CCNC: 48 U/L (ref 34–104)
ALT SERPL W P-5'-P-CCNC: 13 U/L (ref 7–52)
ANA SER QL IA: NEGATIVE
ANION GAP SERPL CALCULATED.3IONS-SCNC: 5 MMOL/L (ref 4–13)
AST SERPL W P-5'-P-CCNC: 16 U/L (ref 13–39)
B BURGDOR IGG+IGM SER QL IA: NEGATIVE
BASOPHILS # BLD AUTO: 0.03 THOUSANDS/ÂΜL (ref 0–0.1)
BASOPHILS NFR BLD AUTO: 0 % (ref 0–1)
BILIRUB SERPL-MCNC: 0.77 MG/DL (ref 0.2–1)
BUN SERPL-MCNC: 13 MG/DL (ref 5–25)
CALCIUM SERPL-MCNC: 9 MG/DL (ref 8.4–10.2)
CHLORIDE SERPL-SCNC: 105 MMOL/L (ref 96–108)
CHOLEST SERPL-MCNC: 214 MG/DL
CO2 SERPL-SCNC: 28 MMOL/L (ref 21–32)
CREAT SERPL-MCNC: 0.74 MG/DL (ref 0.6–1.3)
CRP SERPL HS-MCNC: 3.4 MG/L
EOSINOPHIL # BLD AUTO: 0.35 THOUSAND/ÂΜL (ref 0–0.61)
EOSINOPHIL NFR BLD AUTO: 5 % (ref 0–6)
ERYTHROCYTE [DISTWIDTH] IN BLOOD BY AUTOMATED COUNT: 12.5 % (ref 11.6–15.1)
EST. AVERAGE GLUCOSE BLD GHB EST-MCNC: 105 MG/DL
GFR SERPL CREATININE-BSD FRML MDRD: 97 ML/MIN/1.73SQ M
GLUCOSE P FAST SERPL-MCNC: 93 MG/DL (ref 65–99)
HBA1C MFR BLD: 5.3 %
HCT VFR BLD AUTO: 39.4 % (ref 34.8–46.1)
HDLC SERPL-MCNC: 61 MG/DL
HGB BLD-MCNC: 13.2 G/DL (ref 11.5–15.4)
IMM GRANULOCYTES # BLD AUTO: 0.03 THOUSAND/UL (ref 0–0.2)
IMM GRANULOCYTES NFR BLD AUTO: 0 % (ref 0–2)
LDLC SERPL CALC-MCNC: 141 MG/DL (ref 0–100)
LYMPHOCYTES # BLD AUTO: 2.07 THOUSANDS/ÂΜL (ref 0.6–4.47)
LYMPHOCYTES NFR BLD AUTO: 28 % (ref 14–44)
MCH RBC QN AUTO: 31.5 PG (ref 26.8–34.3)
MCHC RBC AUTO-ENTMCNC: 33.5 G/DL (ref 31.4–37.4)
MCV RBC AUTO: 94 FL (ref 82–98)
MONOCYTES # BLD AUTO: 0.54 THOUSAND/ÂΜL (ref 0.17–1.22)
MONOCYTES NFR BLD AUTO: 7 % (ref 4–12)
NEUTROPHILS # BLD AUTO: 4.45 THOUSANDS/ÂΜL (ref 1.85–7.62)
NEUTS SEG NFR BLD AUTO: 60 % (ref 43–75)
NRBC BLD AUTO-RTO: 0 /100 WBCS
PLATELET # BLD AUTO: 314 THOUSANDS/UL (ref 149–390)
PMV BLD AUTO: 9.4 FL (ref 8.9–12.7)
POTASSIUM SERPL-SCNC: 4 MMOL/L (ref 3.5–5.3)
PROT SERPL-MCNC: 7.3 G/DL (ref 6.4–8.4)
RBC # BLD AUTO: 4.19 MILLION/UL (ref 3.81–5.12)
SODIUM SERPL-SCNC: 138 MMOL/L (ref 135–147)
T3 SERPL-MCNC: 1 NG/ML
T4 FREE SERPL-MCNC: 0.64 NG/DL (ref 0.61–1.12)
TRIGL SERPL-MCNC: 59 MG/DL
TSH SERPL DL<=0.05 MIU/L-ACNC: 1.88 UIU/ML (ref 0.45–4.5)
WBC # BLD AUTO: 7.47 THOUSAND/UL (ref 4.31–10.16)

## 2024-05-10 PROCEDURE — G0476 HPV COMBO ASSAY CA SCREEN: HCPCS | Performed by: OBSTETRICS & GYNECOLOGY

## 2024-05-10 PROCEDURE — 86141 C-REACTIVE PROTEIN HS: CPT

## 2024-05-10 PROCEDURE — 83036 HEMOGLOBIN GLYCOSYLATED A1C: CPT

## 2024-05-10 PROCEDURE — 80053 COMPREHEN METABOLIC PANEL: CPT

## 2024-05-10 PROCEDURE — 84443 ASSAY THYROID STIM HORMONE: CPT

## 2024-05-10 PROCEDURE — 86618 LYME DISEASE ANTIBODY: CPT

## 2024-05-10 PROCEDURE — G0145 SCR C/V CYTO,THINLAYER,RESCR: HCPCS | Performed by: OBSTETRICS & GYNECOLOGY

## 2024-05-10 PROCEDURE — 85025 COMPLETE CBC W/AUTO DIFF WBC: CPT

## 2024-05-10 PROCEDURE — 84480 ASSAY TRIIODOTHYRONINE (T3): CPT

## 2024-05-10 PROCEDURE — 84439 ASSAY OF FREE THYROXINE: CPT

## 2024-05-10 PROCEDURE — S0612 ANNUAL GYNECOLOGICAL EXAMINA: HCPCS | Performed by: OBSTETRICS & GYNECOLOGY

## 2024-05-10 PROCEDURE — 80061 LIPID PANEL: CPT

## 2024-05-10 PROCEDURE — 86038 ANTINUCLEAR ANTIBODIES: CPT

## 2024-05-10 PROCEDURE — 36415 COLL VENOUS BLD VENIPUNCTURE: CPT

## 2024-05-13 LAB
HPV HR 12 DNA CVX QL NAA+PROBE: NEGATIVE
HPV16 DNA CVX QL NAA+PROBE: NEGATIVE
HPV18 DNA CVX QL NAA+PROBE: NEGATIVE

## 2024-05-15 ENCOUNTER — HOSPITAL ENCOUNTER (OUTPATIENT)
Dept: ULTRASOUND IMAGING | Facility: CLINIC | Age: 49
Discharge: HOME | End: 2024-05-15
Payer: COMMERCIAL

## 2024-05-15 ENCOUNTER — HOSPITAL ENCOUNTER (OUTPATIENT)
Dept: MAMMOGRAPHY | Facility: CLINIC | Age: 49
Discharge: HOME | End: 2024-05-15
Payer: COMMERCIAL

## 2024-05-15 VITALS — HEIGHT: 63 IN | BODY MASS INDEX: 31.18 KG/M2 | WEIGHT: 176 LBS

## 2024-05-15 DIAGNOSIS — R92.8 ABNORMAL MAMMOGRAM: ICD-10-CM

## 2024-05-15 PROCEDURE — 77065 DX MAMMO INCL CAD UNI: CPT

## 2024-05-15 PROCEDURE — G0279 TOMOSYNTHESIS, MAMMO: HCPCS

## 2024-05-15 PROCEDURE — 76642 ULTRASOUND BREAST LIMITED: CPT

## 2024-05-20 LAB
LAB AP GYN PRIMARY INTERPRETATION: NORMAL
Lab: NORMAL

## 2024-06-19 ENCOUNTER — OFFICE VISIT (OUTPATIENT)
Age: 49
End: 2024-06-19
Payer: COMMERCIAL

## 2024-06-19 ENCOUNTER — APPOINTMENT (OUTPATIENT)
Age: 49
End: 2024-06-19
Payer: COMMERCIAL

## 2024-06-19 VITALS
SYSTOLIC BLOOD PRESSURE: 123 MMHG | OXYGEN SATURATION: 99 % | WEIGHT: 174.2 LBS | HEART RATE: 103 BPM | DIASTOLIC BLOOD PRESSURE: 72 MMHG | BODY MASS INDEX: 30.87 KG/M2 | HEIGHT: 63 IN

## 2024-06-19 DIAGNOSIS — R76.8 THYROID ANTIBODY POSITIVE: Primary | ICD-10-CM

## 2024-06-19 DIAGNOSIS — F41.9 ANXIETY: ICD-10-CM

## 2024-06-19 DIAGNOSIS — R53.83 FATIGUE, UNSPECIFIED TYPE: ICD-10-CM

## 2024-06-19 LAB
25(OH)D3 SERPL-MCNC: 36.5 NG/ML (ref 30–100)
FERRITIN SERPL-MCNC: 22 NG/ML (ref 11–307)
IRON SATN MFR SERPL: 34 % (ref 15–50)
IRON SERPL-MCNC: 114 UG/DL (ref 50–212)
TIBC SERPL-MCNC: 340 UG/DL (ref 250–450)
UIBC SERPL-MCNC: 226 UG/DL (ref 155–355)

## 2024-06-19 PROCEDURE — 82306 VITAMIN D 25 HYDROXY: CPT

## 2024-06-19 PROCEDURE — 83550 IRON BINDING TEST: CPT

## 2024-06-19 PROCEDURE — 82728 ASSAY OF FERRITIN: CPT

## 2024-06-19 PROCEDURE — 83540 ASSAY OF IRON: CPT

## 2024-06-19 PROCEDURE — 99214 OFFICE O/P EST MOD 30 MIN: CPT

## 2024-06-19 PROCEDURE — 36415 COLL VENOUS BLD VENIPUNCTURE: CPT

## 2024-06-19 NOTE — ASSESSMENT & PLAN NOTE
Discussed benefits of talk therapy and referral sent for psychiatry services.  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  Discussed how anxiety can impact many different aspects of her life.

## 2024-06-19 NOTE — PROGRESS NOTES
Ambulatory Visit  Name: Rebecca Ferrara      : 1975      MRN: [id number]  Encounter Provider: PRAVIN Pulliam  Encounter Date: 2024   Encounter department: [facility]    Assessment & Plan   1. Thyroid antibody positive  Assessment & Plan:  Positive antibodies for Hashimoto's thyroiditis with normal TSH, free T4 and T3.  Pathophysiology of Hashimoto's hypothyroidism reviewed.  Reviewed her current complaints and discussed her symptoms are unlikely related to her thyroid at this time.    Will continue yearly monitoring of labs.  May repeat labs sooner if patient has significant change in her symptoms warranting further investigation of etiology.     Latest Reference Range & Units 05/10/24 09:27   TSH 3RD GENERATON 0.450 - 4.500 uIU/mL 1.876   FREE T4 0.61 - 1.12 ng/dL 0.64   T3, Total 0.9-1.8 ng/mL ng/mL 1.0     Orders:  -     TSH, 3rd generation; Future; Expected date: 2025  -     T4, free; Future; Expected date: 2025  2. Fatigue, unspecified type  Assessment & Plan:  Reports generalized fatigue.  Will check vitamin D level and iron panel.  Reports intermittent episodes of snoring.  Reports awakening in the middle of the night likely due to stress and anxiety surrounding her job and other life stressors.  Orders:  -     Iron Panel (Includes Ferritin, Iron Sat%, Iron, and TIBC)  -     Vitamin D 25 hydroxy  3. Anxiety  Assessment & Plan:  Discussed benefits of talk therapy and referral sent for psychiatry services.  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  Discussed how anxiety can impact many different aspects of her life.  Orders:  -     Ambulatory referral to Psych Services      History of Present Illness     Rebecca Ferrara is a 49 y.o. female who presents for follow-up of positive thyroid antibodies. She was last seen in the office 10/3/2022 by Dr. Odonnell when her lab  work showed increased antithyroid antibodies with normal TSH and free T4.  She went for thyroid ultrasound which revealed a mildly enlarged, diffusely heterogenerous and mildly hypervascular thyroid parenchyma without discrete nodule.     She is currently not taking any medication for her thyroid.     Latest Reference Range & Units 08/09/22 08:27   THYROGLOBULIN AB 0.0 - 0.9 IU/mL 1.3 (H)   THYROID MICROSOMAL ANTIBODY 0 - 34 IU/mL 107 (H)       Review of Systems   Constitutional:  Positive for fatigue. Negative for appetite change and unexpected weight change.   HENT:  Positive for hearing loss (left ear, mild-mod x 6 years), tinnitus and trouble swallowing (solids).    Respiratory:  Positive for wheezing (+asthma, uses inhaler). Negative for cough.    Cardiovascular:  Positive for palpitations and leg swelling.   Gastrointestinal:  Negative for constipation and diarrhea.   Endocrine: Positive for heat intolerance.   Musculoskeletal:  Positive for arthralgias (R knee), neck pain and neck stiffness.   Skin:  Negative for wound.   Allergic/Immunologic: Positive for environmental allergies.   Neurological:  Positive for dizziness. Negative for tremors and headaches.   Hematological:  Bruises/bleeds easily.   Psychiatric/Behavioral:  Positive for decreased concentration and sleep disturbance. The patient is nervous/anxious.      Current Outpatient Medications on File Prior to Visit   Medication Sig Dispense Refill    Arnuity Ellipta 100 MCG/ACT AEPB inhaler       Fish Oil-Cholecalciferol (COROMEGA OMEGA 3+D SQUEEZE PO)       loratadine (CLARITIN) 10 mg tablet Take 10 mg by mouth daily      Multiple Vitamins-Minerals (MULTIVITAMIN GUMMIES WOMENS PO)        No current facility-administered medications on file prior to visit.      Social History     Tobacco Use    Smoking status: Never    Smokeless tobacco: Never   Vaping Use    Vaping status: Never Used   Substance and Sexual Activity    Alcohol use: Yes     Alcohol/week:  "2.0 standard drinks of alcohol     Types: 2 Cans of beer per week     Comment: Social    Drug use: Never    Sexual activity: Not Currently     Partners: Male     Birth control/protection: None     Objective     /72 (BP Location: Right arm, Patient Position: Sitting, Cuff Size: Standard)   Pulse 103   Ht 5' 3\" (1.6 m)   Wt 79 kg (174 lb 3.2 oz)   SpO2 99%   BMI 30.86 kg/m²     Physical Exam  Vitals reviewed.   Constitutional:       General: She is not in acute distress.     Appearance: Normal appearance. She is well-developed.   HENT:      Head: Normocephalic and atraumatic.      Mouth/Throat:      Mouth: Mucous membranes are moist.   Eyes:      Conjunctiva/sclera: Conjunctivae normal.   Neck:      Thyroid: Thyromegaly present. No thyroid tenderness.   Pulmonary:      Effort: Pulmonary effort is normal. No respiratory distress.   Musculoskeletal:         General: No swelling.      Cervical back: Normal range of motion and neck supple.   Skin:     General: Skin is warm and dry.      Capillary Refill: Capillary refill takes less than 2 seconds.   Neurological:      General: No focal deficit present.      Mental Status: She is alert and oriented to person, place, and time.   Psychiatric:         Mood and Affect: Mood normal.         Behavior: Behavior normal.         Thought Content: Thought content normal.       Administrative Statements   I have spent a total time of 39 minutes on 06/19/24 In caring for this patient including Diagnostic results, Prognosis, Risks and benefits of tx options, Instructions for management, Patient and family education, Importance of tx compliance, Risk factor reductions, Impressions, Counseling / Coordination of care, Documenting in the medical record, Reviewing / ordering tests, medicine, procedures  , and Obtaining or reviewing history  .    "

## 2024-06-19 NOTE — ASSESSMENT & PLAN NOTE
Reports generalized fatigue.  Will check vitamin D level and iron panel.  Reports intermittent episodes of snoring.  Reports awakening in the middle of the night likely due to stress and anxiety surrounding her job and other life stressors.

## 2024-06-19 NOTE — ASSESSMENT & PLAN NOTE
Positive antibodies for Hashimoto's thyroiditis with normal TSH, free T4 and T3.  Pathophysiology of Hashimoto's hypothyroidism reviewed.  Reviewed her current complaints and discussed her symptoms are unlikely related to her thyroid at this time.    Will continue yearly monitoring of labs.  May repeat labs sooner if patient has significant change in her symptoms warranting further investigation of etiology.     Latest Reference Range & Units 05/10/24 09:27   TSH 3RD GENERATON 0.450 - 4.500 uIU/mL 1.876   FREE T4 0.61 - 1.12 ng/dL 0.64   T3, Total 0.9-1.8 ng/mL ng/mL 1.0

## 2024-07-05 ENCOUNTER — TELEPHONE (OUTPATIENT)
Dept: PSYCHIATRY | Facility: CLINIC | Age: 49
End: 2024-07-05

## 2024-07-05 NOTE — TELEPHONE ENCOUNTER
Contacted patient in regards to Routine Referral in attempts to verify patient's needs of services and add patient to proper wait list. Spoke with patient and upon requesting verification of patient's last name, Pt hung up the phone. This was a second attempt. Closing referral.

## 2024-07-27 DIAGNOSIS — B36.9 FUNGAL DERMATITIS: ICD-10-CM

## 2024-07-30 RX ORDER — CLOTRIMAZOLE AND BETAMETHASONE DIPROPIONATE 10; .64 MG/G; MG/G
CREAM TOPICAL
Qty: 15 G | Refills: 0 | OUTPATIENT
Start: 2024-07-30

## 2024-07-30 NOTE — TELEPHONE ENCOUNTER
"Physical Therapy Treatment    Patient Name:  Fifi Mcnair   MRN:  [id number]    Recommendations:     Discharge Recommendations:  home health PT     Assessment:     Fifi Mcnair is a 70 y.o. female admitted with a medical diagnosis of S/P total knee arthroplasty, left.  She presents with the following impairments/functional limitations:  weakness, impaired endurance, impaired functional mobilty, gait instability, impaired balance, decreased lower extremity function, decreased safety awareness, pain, decreased ROM, impaired joint extensibility, orthopedic precautions .    Rehab Prognosis: Good and Fair; patient would benefit from acute skilled PT services to address these deficits and reach maximum level of function.    Recent Surgery: Procedure(s) (LRB):  ARTHROPLASTY, KNEE (Left) 2 Days Post-Op    Plan:     During this hospitalization, patient to be seen BID to address the identified rehab impairments via gait training, therapeutic activities, therapeutic exercises and progress toward the following goals:    · Plan of Care Expires:  04/30/19    Subjective     Chief Complaint: knee and LE weakness, "I can't even stand."  Patient/Family Comments/goals: agreeable to PT  Pain/Comfort:  · Pain Rating 1: (did nto rate)  · Location - Side 1: Bilateral  · Location 1: knee  · Pain Addressed 1: Pre-medicate for activity, Nurse notified      Objective:     Communicated with nurses Lilliana and Opal prior to session.  Patient found supine with cryotherapy, telemetry, SCD(insulin pump), bed alarm and [employer] upon PT entry to room.     General Precautions: Standard, fall   Orthopedic Precautions:LLE weight bearing as tolerated   Braces: Knee immobilizer     Functional Mobility:  · Bed Mobility:     · Scooting: minimum assistance  · Bridging: stand by assistance  · Supine to Sit: minimum assistance  · Sit to Supine: minimum assistance    · Transfers:     · Sit to Stand:  minimum assistance with rolling walker  · Chair to Bed: " Please send to Corrie Shelton as she is the one who ordered this for her.  I have not ordered this in the past  minimum assistance with  rolling walker  using  Stand Pivot    · Gait: 75' with CGA using RW and KI donned. w/c follow for safety      Therapeutic Activities and Exercises:   KI donned prior to mobility     pt assisted back to room via w/c post gait    Pt assisted back to bed from w/c after seated rest break.     Patient left supine with all lines intact, call button in reach, bed alarm on and Ava[employer] present and nurses Opal and Lilliana notified..    GOALS:   Multidisciplinary Problems     Physical Therapy Goals        Problem: Physical Therapy Goal    Goal Priority Disciplines Outcome Goal Variances Interventions   Physical Therapy Goal     PT, PT/OT Ongoing (interventions implemented as appropriate)     Description:  Goals to be met by: 19     Patient will increase functional independence with mobility by performin. Supine to sit with [place]  2. Sit to stand transfer with Modified [place] using Rolling Walker  3. Gait  x 250 feet with Modified [place] using Rolling Walker.   4. Stand for 10 minutes with Modified [place] using Rolling Walker  5. Lower extremity exercise program x10-20 reps per handout, with independence                      Time Tracking:     PT Received On: 19  PT Start Time: 1253     PT Stop Time: 1320  PT Total Time (min): 27 min     Billable Minutes: Gait Training 15 and Therapeutic Activity 10    Treatment Type: Treatment  PT/PTA: PTA     PTA Visit Number: 2     Marissa Snell, SADAF  2019   Statement Selected

## 2024-07-31 DIAGNOSIS — B36.9 FUNGAL DERMATITIS: Primary | ICD-10-CM

## 2024-07-31 RX ORDER — CLOTRIMAZOLE AND BETAMETHASONE DIPROPIONATE 10; .64 MG/G; MG/G
CREAM TOPICAL 2 TIMES DAILY
Qty: 45 G | Refills: 0 | Status: SHIPPED | OUTPATIENT
Start: 2024-07-31 | End: 2024-08-14

## 2024-07-31 NOTE — TELEPHONE ENCOUNTER
Corrie prescribed this for the patient at a visit between them on 8/16/23  I have not seen or prescribed this for her

## 2024-12-09 ENCOUNTER — OFFICE VISIT (OUTPATIENT)
Age: 49
End: 2024-12-09
Payer: COMMERCIAL

## 2024-12-09 VITALS
DIASTOLIC BLOOD PRESSURE: 76 MMHG | WEIGHT: 181.8 LBS | BODY MASS INDEX: 32.21 KG/M2 | HEIGHT: 63 IN | SYSTOLIC BLOOD PRESSURE: 120 MMHG | TEMPERATURE: 97.2 F | OXYGEN SATURATION: 96 % | RESPIRATION RATE: 18 BRPM | HEART RATE: 84 BPM

## 2024-12-09 DIAGNOSIS — Z00.00 ADULT GENERAL MEDICAL EXAMINATION: Primary | ICD-10-CM

## 2024-12-09 PROBLEM — F41.9 ANXIETY: Status: RESOLVED | Noted: 2024-06-19 | Resolved: 2024-12-09

## 2024-12-09 PROCEDURE — 99396 PREV VISIT EST AGE 40-64: CPT | Performed by: INTERNAL MEDICINE

## 2024-12-09 RX ORDER — ALBUTEROL SULFATE 90 UG/1
2 INHALANT RESPIRATORY (INHALATION) AS NEEDED
COMMUNITY

## 2024-12-09 NOTE — PATIENT INSTRUCTIONS
"Patient Education     Routine physical for adults   The Basics   Written by the doctors and editors at [facility]   What is a physical? -- A physical is a routine visit, or \"check-up,\" with your doctor. You might also hear it called a \"wellness visit\" or \"preventive visit.\"  During each visit, the doctor will:   Ask about your physical and mental health   Ask about your habits, behaviors, and lifestyle   Do an exam   Give you vaccines if needed   Talk to you about any medicines you take   Give advice about your health   Answer your questions  Getting regular check-ups is an important part of taking care of your health. It can help your doctor find and treat any problems you have. But it's also important for preventing health problems.  A routine physical is different from a \"sick visit.\" A sick visit is when you see a doctor because of a health concern or problem. Since physicals are scheduled ahead of time, you can think about what you want to ask the doctor.  How often should I get a physical? -- It depends on your age and health. In general, for people age 21 years and older:   If you are younger than 50 years, you might be able to get a physical every 3 years.   If you are 50 years or older, your doctor might recommend a physical every year.  If you have an ongoing health condition, like diabetes or high blood pressure, your doctor will probably want to see you more often.  What happens during a physical? -- In general, each visit will include:   Physical exam - The doctor or nurse will check your height, weight, heart rate, and blood pressure. They will also look at your eyes and ears. They will ask about how you are feeling and whether you have any symptoms that bother you.   Medicines - It's a good idea to bring a list of all the medicines you take to each doctor visit. Your doctor will talk to you about your medicines and answer any questions. Tell them if you are having any side effects that bother you. You " "should also tell them if you are having trouble paying for any of your medicines.   Habits and behaviors - This includes:   Your diet   Your exercise habits   Whether you smoke, drink alcohol, or use drugs   Whether you are sexually active   Whether you feel safe at home  Your doctor will talk to you about things you can do to improve your health and lower your risk of health problems. They will also offer help and support. For example, if you want to quit smoking, they can give you advice and might prescribe medicines. If you want to improve your diet or get more physical activity, they can help you with this, too.   Lab tests, if needed - The tests you get will depend on your age and situation. For example, your doctor might want to check your:   Cholesterol   Blood sugar   Iron level   Vaccines - The recommended vaccines will depend on your age, health, and what vaccines you already had. Vaccines are very important because they can prevent certain serious or deadly infections.   Discussion of screening - \"Screening\" means checking for diseases or other health problems before they cause symptoms. Your doctor can recommend screening based on your age, risk, and preferences. This might include tests to check for:   Cancer, such as breast, prostate, cervical, ovarian, colorectal, prostate, lung, or skin cancer   Sexually transmitted infections, such as chlamydia and gonorrhea   Mental health conditions like depression and anxiety  Your doctor will talk to you about the different types of screening tests. They can help you decide which screenings to have. They can also explain what the results might mean.   Answering questions - The physical is a good time to ask the doctor or nurse questions about your health. If needed, they can refer you to other doctors or specialists, too.  Adults older than 65 years often need other care, too. As you get older, your doctor will talk to you about:   How to prevent falling at " home   Hearing or vision tests   Memory testing   How to take your medicines safely   Making sure that you have the help and support you need at home  All topics are updated as new evidence becomes available and our peer review process is complete.  This topic retrieved from [employer] on: May 02, 2024.  Topic 217994 Version 1.0  Release: 32.4.3 - C32.122  © 2024 UpToDate, Inc. and/or its affiliates. All rights reserved.  Consumer Information Use and Disclaimer   Disclaimer: This generalized information is a limited summary of diagnosis, treatment, and/or medication information. It is not meant to be comprehensive and should be used as a tool to help the user understand and/or assess potential diagnostic and treatment options. It does NOT include all information about conditions, treatments, medications, side effects, or risks that may apply to a specific patient. It is not intended to be medical advice or a substitute for the medical advice, diagnosis, or treatment of a health care provider based on the health care provider's examination and assessment of a patient's specific and unique circumstances. Patients must speak with a health care provider for complete information about their health, medical questions, and treatment options, including any risks or benefits regarding use of medications. This information does not endorse any treatments or medications as safe, effective, or approved for treating a specific patient. UpToDate, Inc. and its affiliates disclaim any warranty or liability relating to this information or the use thereof.The use of this information is governed by the Terms of Use, available at https://www.woltersTraNet'teuwer.com/en/know/clinical-effectiveness-terms. 2024© UpToDate, Inc. and its affiliates and/or licensors. All rights reserved.  Copyright   © 2024 UpToDate, Inc. and/or its affiliates. All rights reserved.

## 2024-12-09 NOTE — PROGRESS NOTES
Adult Annual Physical  Name: Rebecca Ferrara      : 1975      MRN: [id number]  Encounter Provider: Douglas Barrera DO  Encounter Date: 2024   Encounter department: [facility]  :  Assessment & Plan  Adult general medical examination         Immunizations and preventive care screenings were discussed with patient today. Appropriate education was printed on patient's after visit summary.    Counseling:  Alcohol/drug use: discussed moderation in alcohol intake, the recommendations for healthy alcohol use, and avoidance of illicit drug use.  Dental Health: discussed importance of regular tooth brushing, flossing, and dental visits.  Injury prevention: discussed safety/seat belts, safety helmets, smoke detectors, carbon monoxide detectors, and smoking near bedding or upholstery.  Sexual health: discussed sexually transmitted diseases, partner selection, use of condoms, avoidance of unintended pregnancy, and contraceptive alternatives.  Exercise: the importance of regular exercise/physical activity was discussed. Recommend exercise 3-5 times per week for at least 30 minutes.          History of Present Illness     Adult Annual Physical:  Patient presents for annual physical.     Diet and Physical Activity:    - Exercise: no formal exercise.    /GYN Health:  - Follows with GYN: yes.     History of Present Illness  The patient presents for a routine checkup.    She is considering initiating an exercise regimen and seeks medical clearance.    She reports experiencing sinus pressure in her face and head, which began on Thursday or possibly earlier. The pressure is intermittent. She reports no cough or difficulty breathing.    She experiences occasional palpitations, which can occur at any time, even during periods of rest. She does not notice an increase in palpitations during periods of stress. She does not wake up with a racing heart or heavy breathing.    Her energy levels are  "generally high during the day. She occasionally wakes up at 4:00 AM and sometimes struggles to fall back asleep, although at times she can fall back asleep immediately.    Review of Systems   Constitutional:  Positive for fatigue. Negative for appetite change, chills and fever.   HENT:  Positive for sinus pressure. Negative for congestion, hearing loss, postnasal drip, rhinorrhea, sore throat, tinnitus and trouble swallowing.    Eyes:  Negative for pain, discharge, redness and visual disturbance.   Respiratory:  Negative for cough, chest tightness, shortness of breath and wheezing.    Cardiovascular:  Positive for palpitations. Negative for chest pain and leg swelling.   Gastrointestinal:  Negative for abdominal distention, abdominal pain, blood in stool, constipation, diarrhea, nausea and vomiting.   Endocrine: Negative for cold intolerance, heat intolerance, polydipsia, polyphagia and polyuria.   Genitourinary:  Negative for difficulty urinating, dysuria, frequency, hematuria and urgency.   Musculoskeletal:  Negative for arthralgias, back pain, gait problem, joint swelling, myalgias, neck pain and neck stiffness.   Skin:  Negative for color change and rash.   Neurological:  Negative for dizziness, tremors, seizures, syncope, speech difficulty, weakness, light-headedness, numbness and headaches.   Hematological:  Negative for adenopathy. Does not bruise/bleed easily.   Psychiatric/Behavioral:  Positive for sleep disturbance. Negative for agitation, behavioral problems, confusion, hallucinations and suicidal ideas. The patient is not nervous/anxious.      Medical History Reviewed by provider this encounter:  Tobacco  Allergies  Meds  Problems  Med Hx  Surg Hx  Fam Hx         Objective   /76 (BP Location: Left arm, Patient Position: Sitting, Cuff Size: Standard)   Pulse 84   Temp (!) 97.2 °F (36.2 °C) (Tympanic)   Resp 18   Ht 5' 3\" (1.6 m)   Wt 82.5 kg (181 lb 12.8 oz)   SpO2 96%   BMI 32.20 " kg/m²     Physical Exam  Constitutional:       General: She is not in acute distress.     Appearance: She is not ill-appearing.   HENT:      Right Ear: Tympanic membrane, ear canal and external ear normal.      Left Ear: Tympanic membrane, ear canal and external ear normal.      Nose: Nose normal. No congestion or rhinorrhea.      Mouth/Throat:      Mouth: Mucous membranes are moist.      Pharynx: No oropharyngeal exudate or posterior oropharyngeal erythema.   Cardiovascular:      Rate and Rhythm: Normal rate and regular rhythm.      Heart sounds: No murmur heard.  Pulmonary:      Effort: Pulmonary effort is normal. No respiratory distress.      Breath sounds: No wheezing.   Abdominal:      General: Bowel sounds are normal. There is no distension.      Tenderness: There is no abdominal tenderness.   Musculoskeletal:      Right lower leg: No edema.      Left lower leg: No edema.   Neurological:      Mental Status: She is alert.       Douglas Arroyovaleriano, DO

## 2025-01-10 ENCOUNTER — OFFICE VISIT (OUTPATIENT)
Dept: INTERNAL MEDICINE CLINIC | Facility: CLINIC | Age: 50
End: 2025-01-10
Payer: COMMERCIAL

## 2025-01-10 VITALS
SYSTOLIC BLOOD PRESSURE: 124 MMHG | RESPIRATION RATE: 18 BRPM | TEMPERATURE: 98.4 F | DIASTOLIC BLOOD PRESSURE: 86 MMHG | WEIGHT: 184.2 LBS | BODY MASS INDEX: 32.64 KG/M2 | HEART RATE: 91 BPM | OXYGEN SATURATION: 98 % | HEIGHT: 63 IN

## 2025-01-10 DIAGNOSIS — H93.13 TINNITUS AURIUM, BILATERAL: ICD-10-CM

## 2025-01-10 DIAGNOSIS — Z76.89 ESTABLISHING CARE WITH NEW DOCTOR, ENCOUNTER FOR: Primary | ICD-10-CM

## 2025-01-10 DIAGNOSIS — H91.92 HEARING LOSS OF LEFT EAR, UNSPECIFIED HEARING LOSS TYPE: ICD-10-CM

## 2025-01-10 DIAGNOSIS — M25.673 STIFFNESS OF ANKLE JOINT, UNSPECIFIED LATERALITY: ICD-10-CM

## 2025-01-10 DIAGNOSIS — R41.89 BRAIN FOG: ICD-10-CM

## 2025-01-10 DIAGNOSIS — R42 DIZZINESS: ICD-10-CM

## 2025-01-10 DIAGNOSIS — R00.2 PALPITATIONS: ICD-10-CM

## 2025-01-10 DIAGNOSIS — E06.3 HASHIMOTO'S DISEASE: ICD-10-CM

## 2025-01-10 DIAGNOSIS — Z13.1 DIABETES MELLITUS SCREENING: ICD-10-CM

## 2025-01-10 DIAGNOSIS — R68.89 SENSITIVITY TO LIGHT: ICD-10-CM

## 2025-01-10 DIAGNOSIS — E78.2 MODERATE MIXED HYPERLIPIDEMIA NOT REQUIRING STATIN THERAPY: ICD-10-CM

## 2025-01-10 DIAGNOSIS — Z12.31 ENCOUNTER FOR SCREENING MAMMOGRAM FOR MALIGNANT NEOPLASM OF BREAST: ICD-10-CM

## 2025-01-10 DIAGNOSIS — R41.840 DIFFICULTY CONCENTRATING: ICD-10-CM

## 2025-01-10 DIAGNOSIS — R13.10 DYSPHAGIA, UNSPECIFIED TYPE: ICD-10-CM

## 2025-01-10 DIAGNOSIS — R76.8 THYROID ANTIBODY POSITIVE: ICD-10-CM

## 2025-01-10 DIAGNOSIS — J45.30 MILD PERSISTENT ASTHMA WITHOUT COMPLICATION: ICD-10-CM

## 2025-01-10 DIAGNOSIS — R14.0 BLOATING: ICD-10-CM

## 2025-01-10 PROBLEM — E66.09 CLASS 1 OBESITY DUE TO EXCESS CALORIES WITHOUT SERIOUS COMORBIDITY WITH BODY MASS INDEX (BMI) OF 32.0 TO 32.9 IN ADULT: Status: ACTIVE | Noted: 2024-12-24

## 2025-01-10 PROBLEM — E66.811 CLASS 1 OBESITY DUE TO EXCESS CALORIES WITHOUT SERIOUS COMORBIDITY WITH BODY MASS INDEX (BMI) OF 32.0 TO 32.9 IN ADULT: Status: ACTIVE | Noted: 2024-12-24

## 2025-01-10 PROBLEM — R53.83 FATIGUE: Status: RESOLVED | Noted: 2024-06-19 | Resolved: 2025-01-10

## 2025-01-10 PROCEDURE — 99214 OFFICE O/P EST MOD 30 MIN: CPT | Performed by: INTERNAL MEDICINE

## 2025-01-10 NOTE — ASSESSMENT & PLAN NOTE
Says it is controlled.   Orders:    CBC and differential; Future    Comprehensive metabolic panel; Future

## 2025-01-10 NOTE — ASSESSMENT & PLAN NOTE
TPO and TgAb + (2022) --> Hashimoto's disease     Thyroid u/s (2022): Mildly enlarged, diffusely heterogeneous and mildly hypervascular thyroid parenchyma without discrete nodule.     Currently TSH is in normal range, so no indication to treat  Discussed lifestyle improvements that are beneficial for Hashimoto's patients - reducing processed foods, increased exercise, reducing gluten - all of which she is doing.    Continue endocrine f/u.

## 2025-01-10 NOTE — PROGRESS NOTES
Name: Rebecca Ferrara      : 1975      MRN: [id number]  Encounter Provider: Alice Brown MD  Encounter Date: 1/10/2025   Encounter department: [facility] INTERNAL MEDICINE [place]  :  Assessment & Plan  Establishing care with new doctor, encounter for  Here to establish care, works for non profit, is .       Hashimoto's disease  TPO and TgAb + () --> Hashimoto's disease     Thyroid u/s (): Mildly enlarged, diffusely heterogeneous and mildly hypervascular thyroid parenchyma without discrete nodule.     Currently TSH is in normal range, so no indication to treat  Discussed lifestyle improvements that are beneficial for Hashimoto's patients - reducing processed foods, increased exercise, reducing gluten - all of which she is doing.    Continue endocrine f/u.       Encounter for screening mammogram for malignant neoplasm of breast    Orders:  •  Mammo screening bilateral w 3d and cad; Future    Brain fog  Short term memory issues. Reports sleeping well, says her  says she snores, denies apneic episodes.   Orders:  •  CBC and differential; Future  •  Comprehensive metabolic panel; Future    Palpitations  3-4 days before she gets her period. Has not had a period since October. Denies any recent episodes.        Hearing loss of left ear, unspecified hearing loss type  Noted. H// tinnitus B/L       Tinnitus aurium, bilateral  Unknown etiology. Mild at this point.           Difficulty concentrating  With brain fog. Consider neurology vs sleep medicine.   Orders:  •  CBC and differential; Future  •  Comprehensive metabolic panel; Future    Bloating  Has regular BM, happens with her periods.  Orders:  •  CBC and differential; Future  •  Comprehensive metabolic panel; Future    Dysphagia, unspecified type  Difficulty swallowing pills, sometimes solids get stuck, will obtain barium swallow  Orders:  •  FL barium swallow ROUTINE esophagus; Future  •  CBC and differential; Future  •   "Comprehensive metabolic panel; Future    Mild persistent asthma without complication  Says it is controlled.   Orders:  •  CBC and differential; Future  •  Comprehensive metabolic panel; Future    Stiffness of ankle joint, unspecified laterality  X 1 year, thinks it is r/t weight. First step in the morning,        Dizziness  Happens when she looks up.  Orders:  •  CBC and differential; Future  •  Comprehensive metabolic panel; Future    Sensitivity to light  H/o astigmatism, follows with optometrist.  Orders:  •  Comprehensive metabolic panel; Future    Thyroid antibody positive  See above.       Moderate mixed hyperlipidemia not requiring statin therapy    Orders:  •  Lipid Panel with Direct LDL reflex; Future    Diabetes mellitus screening    Orders:  •  Hemoglobin A1C; Future          Depression Screening and Follow-up Plan: Patient was screened for depression during today's encounter. They screened negative with a PHQ-2 score of 1.  Patient with underlying depression and was advised to continue current medications as prescribed.     History of Present Illness     Here to establish care.    Review of Systems   Constitutional:  Negative for chills and fever.   HENT:  Negative for ear pain and sore throat.    Eyes:  Negative for pain and visual disturbance.   Respiratory:  Negative for cough and shortness of breath.    Cardiovascular:  Negative for chest pain and palpitations.   Gastrointestinal:  Negative for abdominal pain and vomiting.   Genitourinary:  Negative for dysuria and hematuria.   Musculoskeletal:  Negative for arthralgias and back pain.   Skin:  Negative for color change and rash.   Neurological:  Negative for seizures and syncope.   All other systems reviewed and are negative.      Objective   /86 (BP Location: Left arm, Patient Position: Sitting, Cuff Size: Standard)   Pulse 91   Temp 98.4 °F (36.9 °C) (Tympanic)   Resp 18   Ht 5' 3\" (1.6 m)   Wt 83.6 kg (184 lb 3.2 oz)   SpO2 98%   BMI " 32.63 kg/m²      Physical Exam  Vitals and nursing note reviewed.   Constitutional:       General: She is not in acute distress.     Appearance: She is well-developed.   HENT:      Head: Normocephalic and atraumatic.   Eyes:      Conjunctiva/sclera: Conjunctivae normal.   Cardiovascular:      Rate and Rhythm: Normal rate and regular rhythm.      Heart sounds: No murmur heard.  Pulmonary:      Effort: Pulmonary effort is normal. No respiratory distress.      Breath sounds: Normal breath sounds.   Abdominal:      General: Abdomen is flat. Bowel sounds are normal.      Palpations: Abdomen is soft.      Tenderness: There is no abdominal tenderness.   Musculoskeletal:         General: No swelling.      Cervical back: Neck supple.   Skin:     General: Skin is warm and dry.      Capillary Refill: Capillary refill takes less than 2 seconds.   Neurological:      Mental Status: She is alert.   Psychiatric:         Mood and Affect: Mood normal.

## 2025-02-28 DIAGNOSIS — Z01.89 PATIENT REQUEST FOR DIAGNOSTIC TESTING: ICD-10-CM

## 2025-02-28 DIAGNOSIS — E06.3 HASHIMOTO'S DISEASE: Primary | ICD-10-CM

## 2025-03-01 ENCOUNTER — APPOINTMENT (OUTPATIENT)
Dept: LAB | Facility: HOSPITAL | Age: 50
End: 2025-03-01
Payer: COMMERCIAL

## 2025-03-01 DIAGNOSIS — R42 DIZZINESS: ICD-10-CM

## 2025-03-01 DIAGNOSIS — R76.8 THYROID ANTIBODY POSITIVE: ICD-10-CM

## 2025-03-01 DIAGNOSIS — R68.89 SENSITIVITY TO LIGHT: ICD-10-CM

## 2025-03-01 DIAGNOSIS — E06.3 HASHIMOTO'S DISEASE: ICD-10-CM

## 2025-03-01 DIAGNOSIS — R41.89 BRAIN FOG: ICD-10-CM

## 2025-03-01 DIAGNOSIS — E06.3 CHRONIC LYMPHOCYTIC THYROIDITIS: Primary | ICD-10-CM

## 2025-03-01 DIAGNOSIS — E66.811 OBESITY, CLASS I, BMI 30-34.9: ICD-10-CM

## 2025-03-01 DIAGNOSIS — R13.10 DYSPHAGIA, UNSPECIFIED TYPE: ICD-10-CM

## 2025-03-01 DIAGNOSIS — E66.09 EXOGENOUS OBESITY: ICD-10-CM

## 2025-03-01 DIAGNOSIS — R41.840 DIFFICULTY CONCENTRATING: ICD-10-CM

## 2025-03-01 DIAGNOSIS — R14.0 BLOATING: ICD-10-CM

## 2025-03-01 DIAGNOSIS — Z13.1 DIABETES MELLITUS SCREENING: ICD-10-CM

## 2025-03-01 DIAGNOSIS — E78.2 MODERATE MIXED HYPERLIPIDEMIA NOT REQUIRING STATIN THERAPY: ICD-10-CM

## 2025-03-01 DIAGNOSIS — Z01.89 PATIENT REQUEST FOR DIAGNOSTIC TESTING: ICD-10-CM

## 2025-03-01 DIAGNOSIS — J45.30 MILD PERSISTENT ASTHMA WITHOUT COMPLICATION: ICD-10-CM

## 2025-03-01 LAB
25(OH)D3 SERPL-MCNC: 36.7 NG/ML (ref 30–100)
EST. AVERAGE GLUCOSE BLD GHB EST-MCNC: 105 MG/DL
HBA1C MFR BLD: 5.3 %
T3FREE SERPL-MCNC: 3.05 PG/ML (ref 2.5–3.9)
T4 FREE SERPL-MCNC: 0.69 NG/DL (ref 0.61–1.12)
TSH SERPL DL<=0.05 MIU/L-ACNC: 1.72 UIU/ML (ref 0.45–4.5)

## 2025-03-01 PROCEDURE — 84255 ASSAY OF SELENIUM: CPT

## 2025-03-01 PROCEDURE — 36415 COLL VENOUS BLD VENIPUNCTURE: CPT

## 2025-03-01 PROCEDURE — 83036 HEMOGLOBIN GLYCOSYLATED A1C: CPT

## 2025-03-01 PROCEDURE — 82533 TOTAL CORTISOL: CPT

## 2025-03-01 PROCEDURE — 84439 ASSAY OF FREE THYROXINE: CPT

## 2025-03-01 PROCEDURE — 82306 VITAMIN D 25 HYDROXY: CPT

## 2025-03-01 PROCEDURE — 84481 FREE ASSAY (FT-3): CPT

## 2025-03-01 PROCEDURE — 84482 T3 REVERSE: CPT

## 2025-03-01 PROCEDURE — 84443 ASSAY THYROID STIM HORMONE: CPT

## 2025-03-01 PROCEDURE — 84630 ASSAY OF ZINC: CPT

## 2025-03-01 PROCEDURE — 83789 MASS SPECTROMETRY QUAL/QUAN: CPT

## 2025-03-03 ENCOUNTER — RESULTS FOLLOW-UP (OUTPATIENT)
Age: 50
End: 2025-03-03

## 2025-03-03 LAB — IODINE SERPL-MCNC: 36 UG/L (ref 40–92)

## 2025-03-05 LAB — ZINC SERPL-MCNC: 79 UG/DL (ref 44–115)

## 2025-03-06 LAB — T3REVERSE SERPL-MCNC: 14 NG/DL (ref 9.2–24.1)

## 2025-03-09 LAB
CORTIS BS SAL-MCNC: 0.01 UG/DL
CORTIS SP1 P CHAL SAL-MCNC: 0.01 UG/DL

## 2025-03-10 LAB — SELENIUM SERPL-MCNC: 122 UG/L (ref 93–198)

## 2025-03-12 ENCOUNTER — TELEPHONE (OUTPATIENT)
Dept: ENDOCRINOLOGY | Facility: CLINIC | Age: 50
End: 2025-03-12

## 2025-03-12 NOTE — TELEPHONE ENCOUNTER
Called patient to discuss My chart messages that have been sent to Elly. Told patient she needs to establish care with our office or [facility]. She demanded we send her for another Ultrasound. I told her we cannot do that right now as she was seeing [facility] and needs to follow up with us. I made her aware that Elly is not comfortable seeing her with all the aggressive messages being sent. I offered to schedule her with another provider in the department and she refused, because she does not want to go to another location. I made her aware we have another provider starting in September that we can schedule her with.  She does not want to wait and does not want to go to another location. She then hung up.

## 2025-03-24 ENCOUNTER — OFFICE VISIT (OUTPATIENT)
Dept: OBGYN CLINIC | Facility: CLINIC | Age: 50
End: 2025-03-24
Payer: COMMERCIAL

## 2025-03-24 VITALS — BODY MASS INDEX: 32.28 KG/M2 | WEIGHT: 182.2 LBS | HEIGHT: 63 IN

## 2025-03-24 DIAGNOSIS — M24.811 INTERNAL DERANGEMENT OF RIGHT SHOULDER: Primary | ICD-10-CM

## 2025-03-24 PROCEDURE — 99214 OFFICE O/P EST MOD 30 MIN: CPT | Performed by: ORTHOPAEDIC SURGERY

## 2025-03-24 NOTE — PROGRESS NOTES
Name: Rebecca Ferrara      : 1975      MRN: [id number]  Encounter Provider: Sheldon Camacho DO  Encounter Date: 3/24/2025   Encounter department: [facility] ORTHOPEDIC CARE SPECIALISTS [place]  :  Assessment & Plan  Internal derangement of right shoulder    Orders:    MRI shoulder right wo contrast; Future      Right shoulder concern for rotator cuff and biceps tearing  X-rays reviewed and discussed with patient  Patient to be weightbearing as tolerated to RUE (Right Upper Extremity)  ROM as tolerated to RUE (Right Upper Extremity)  Discussed with patient treatment options including over the counter analgesics, topical creams, range of motion, physical therapy, corticosteroid injection, additional imaging   Patient to begin physical therapy for strength and mobility training, referral provided    Patient to continue at home analgesic regimen with Tylenol   Due to persistent pain and weakness affecting activities of daily living, positive special testing despite nonoperative treatment including oral analgesics, activity modification, physical therapy, and home exercise plan, MRI study has been ordered to evaluate for suspected rotator cuff tear.   Patient to follow up upon completion of MRI study.        History of the Present Illness   History of Present Illness   HPI   Rebecca Ferrara is a 49 y.o. female with Right shoulder pain.  The patient was previously treated on 10/24/2023 for right shoulder strain including biceps and pectoralis major. Her initial onset of pain was after a vigorous day of yard work. She felt pain the following day. She previously attended physical therapy and has continued with a home exercise plan. The patient noted mild improvement with physical therapy. Pain has stayed the same since this time. Pain is localized to the anterior shoulder and chest. Pain is worsened with reaching and pulling activities along with lifting. She does feel weak.  Patient is accompanied by her   "for her exam today.      Review of Systems     Review of Systems   Constitutional:  Negative for appetite change and unexpected weight change.   HENT:  Negative for congestion and trouble swallowing.    Eyes:  Negative for visual disturbance.   Respiratory:  Negative for cough and shortness of breath.    Cardiovascular:  Negative for chest pain and palpitations.   Gastrointestinal:  Negative for nausea and vomiting.   Endocrine: Negative for cold intolerance and heat intolerance.   Musculoskeletal:  Positive for arthralgias (right shoulder). Negative for joint swelling and myalgias.   Skin:  Negative for rash.   Neurological:  Negative for numbness.       Physical Exam   Objective   Ht 5' 3\" (1.6 m)   Wt 82.6 kg (182 lb 3.2 oz)   BMI 32.28 kg/m²        Right Shoulder:   Active range of motion   160 degrees forward flexion  170 degrees abduction  80 degrees external rotation   L1 internal rotation    Passive range of motion   180 degrees of forward flexion   There is no tenderness present.   Forward flexion testing 4/5  External rotation testing 4+/5  Internal rotation testing 4+/5  Leigh test is positive  [place]'s test is negative    Speed's test is Positive  The patient is neurovascularly intact distally in the extremity.      Data Review     I have personally reviewed pertinent films in PACS, and my interpretation follows.    X-rays taken 10/24/2023 of Right shoulder independently reviewed and demonstrate well preserved joint spaces. No acute osseous abnormality, fracture or dislocation.     Previous physical therapy notes reviewed.  Left    Social History     Tobacco Use    Smoking status: Never    Smokeless tobacco: Never   Vaping Use    Vaping status: Never Used   Substance Use Topics    Alcohol use: Yes     Alcohol/week: 1.0 standard drink of alcohol     Types: 1 Standard drinks or equivalent per week     Comment: Social    Drug use: Never           Procedures  None performed.     Maureen Wynn" Attestation      I,:  Maureen Tellez am acting as a scribe while in the presence of the attending physician.:       I,:  Sheldon Camacho, DO personally performed the services described in this documentation    as scribed in my presence.:

## 2025-04-08 ENCOUNTER — HOSPITAL ENCOUNTER (OUTPATIENT)
Dept: MRI IMAGING | Facility: HOSPITAL | Age: 50
Discharge: HOME | End: 2025-04-08
Attending: ORTHOPAEDIC SURGERY
Payer: COMMERCIAL

## 2025-04-08 DIAGNOSIS — M24.811 INTERNAL DERANGEMENT OF RIGHT SHOULDER: ICD-10-CM

## 2025-04-08 PROCEDURE — 73221 MRI JOINT UPR EXTREM W/O DYE: CPT

## 2025-04-18 ENCOUNTER — OFFICE VISIT (OUTPATIENT)
Dept: OBGYN CLINIC | Facility: CLINIC | Age: 50
End: 2025-04-18
Payer: COMMERCIAL

## 2025-04-18 VITALS — BODY MASS INDEX: 32.46 KG/M2 | WEIGHT: 183.2 LBS | HEIGHT: 63 IN

## 2025-04-18 DIAGNOSIS — M67.813 BICEPS TENDINOSIS OF RIGHT SHOULDER: Primary | ICD-10-CM

## 2025-04-18 DIAGNOSIS — M25.511 CHRONIC RIGHT SHOULDER PAIN: ICD-10-CM

## 2025-04-18 DIAGNOSIS — M75.101 TEAR OF RIGHT ROTATOR CUFF, UNSPECIFIED TEAR EXTENT, UNSPECIFIED WHETHER TRAUMATIC: ICD-10-CM

## 2025-04-18 DIAGNOSIS — G89.29 CHRONIC RIGHT SHOULDER PAIN: ICD-10-CM

## 2025-04-18 PROCEDURE — 99213 OFFICE O/P EST LOW 20 MIN: CPT | Performed by: ORTHOPAEDIC SURGERY

## 2025-04-18 NOTE — PROGRESS NOTES
Name: Rebecca Ferrara      : 1975      MRN: [id number]  Encounter Provider: Sheldon Camacho DO  Encounter Date: 2025   Encounter department: [facility] ORTHOPEDIC CARE SPECIALISTS [place]  :  Assessment & Plan  Biceps tendinosis of right shoulder    Orders:    Ambulatory referral to Physical Therapy; Future    Chronic right shoulder pain    Orders:    Ambulatory Referral to Orthopedic Surgery; Future    Ambulatory referral to Physical Therapy; Future    Tear of right rotator cuff, unspecified tear extent, unspecified whether traumatic    Orders:    Ambulatory Referral to Physical Therapy; Future    Ambulatory Referral to Orthopedic Surgery; Future    Ambulatory referral to Physical Therapy; Future    Right shoulder proximal biceps tendinitis, small full-thickness tear of superior subscapularis tendon.  MRI results reviewed and discussed with patient and .  We discussed that history and physical exam suggest the patient is more symptomatic from biceps pathology than subscapularis pathology at this time.  Nonoperative treatments were discussed in the form of oral analgesics, activity medication, injection therapy, formal physical therapy.  Risks of nonoperative treatment include persistent pain, weakness, functional limitation, tear progression, need for subsequent surgery.    Surgical intervention was discussed in the form of right shoulder arthroscopy with open subpectoral biceps tenodesis and subscapularis repair.  Risks of surgery were briefly discussed.  The patient would like to continue with nonoperative treatment at this time.  New prescription placed for formal physical therapy.  They may gently increase with strengthening as tolerated.  Provided with referral to Dr. Comer for ultra sound guided bicep tendon sheath injection.   Follow up in 3 months for repeat evaluation.  Patient instructed to keep a close log of improvement postinjection.  If symptoms persist or worsen, will consider  "surgical intervention.    History of the Present Illness   History of Present Illness   HPI   Rebecca Ferrara is a 49 y.o. female reports for a follow up evaluation of her right shoulder and discuss MRI results. She reports no significant changes since last seen on 3/24/25. Patient treated on 10/24/2023 for right shoulder strain including biceps and pectoralis major. Her initial onset of pain was after a vigorous day of yard work. She felt pain the following day. She has been treating with formal PT for the past year and a half she states and has continues with a home exercise plan. The patient noted mild improvement with physical therapy. Pain has stayed the same since this time 7/10. Pain is localized to the anterior shoulder and chest. Pain is worsened with reaching and pulling activities along with lifting. She does feel weak.  Patient is accompanied by her  for her exam today.      Review of Systems     Review of Systems   Constitutional:  Negative for appetite change and unexpected weight change.   HENT:  Negative for congestion and trouble swallowing.    Eyes:  Negative for visual disturbance.   Respiratory:  Negative for cough and shortness of breath.    Cardiovascular:  Negative for chest pain and palpitations.   Gastrointestinal:  Negative for nausea and vomiting.   Endocrine: Negative for cold intolerance and heat intolerance.   Musculoskeletal:  Positive for arthralgias (right shoulder). Negative for joint swelling and myalgias.   Skin:  Negative for rash.   Neurological:  Negative for numbness.       Physical Exam   Objective   Ht 5' 3\" (1.6 m)   Wt 83.1 kg (183 lb 3.2 oz)   BMI 32.45 kg/m²        Right Shoulder:   Active range of motion   180 degrees forward flexion  170 degrees abduction  90 degrees external rotation   L1 internal rotation    Passive range of motion   180 degrees of forward flexion   There is mild tenderness present over proximal biceps tendon.   Forward flexion testing " 5/5  External rotation testing 5/5  Internal rotation testing 5/5  [place] test negative  Leigh test is positive  [place]'s test is negative    Speed's test is Positive  The patient is neurovascularly intact distally in the extremity.      Data Review     I have personally reviewed pertinent films in PACS, and my interpretation follows.    MRI right shoulder obtained 4/8/2025 demonstrates mild bicipital tendinosis and small near full thickness tear of the distal superior subscapularis tendon. Mild glenohumeral joint effusion.       Social History     Tobacco Use    Smoking status: Never    Smokeless tobacco: Never   Vaping Use    Vaping status: Never Used   Substance Use Topics    Alcohol use: Yes     Alcohol/week: 1.0 standard drink of alcohol     Types: 1 Standard drinks or equivalent per week     Comment: Social    Drug use: Never       Procedures  None performed.       Scribe Attestation      I,:  Clover Lowry am acting as a scribe while in the presence of the attending physician.:       I,:  Sheldon Camacho, DO personally performed the services described in this documentation    as scribed in my presence.:

## 2025-04-23 ENCOUNTER — OFFICE VISIT (OUTPATIENT)
Dept: OBGYN CLINIC | Facility: CLINIC | Age: 50
End: 2025-04-23
Payer: COMMERCIAL

## 2025-04-23 VITALS — BODY MASS INDEX: 32.78 KG/M2 | WEIGHT: 185 LBS | HEIGHT: 63 IN

## 2025-04-23 DIAGNOSIS — M67.813 BICEPS TENDINOSIS OF RIGHT SHOULDER: Primary | ICD-10-CM

## 2025-04-23 PROCEDURE — 99203 OFFICE O/P NEW LOW 30 MIN: CPT | Performed by: FAMILY MEDICINE

## 2025-04-23 NOTE — PROGRESS NOTES
"Name: Rebecca Ferrara      : 1975      MRN: [id number]  Encounter Provider: Claudio Comer DO  Encounter Date: 2025   Encounter department: [facility] ORTHOPED[facility] CARE SPECIAL[facility]  :  Assessment & Plan  Biceps tendinosis of right shoulder  We discussed the nature of proximal biceps tendinitis which was confirmed on MRI study.  Treatment options were discussed for bicep tendinitis including physical therapy, oral anti-inflammatory medication, consideration for injection treatment including both cortisone injection under ultrasound guidance versus PRP injection.  Reading information was provided in regards to PRP injection treatment.  Questions were answered in regards to treatment options.  Patient is interested in possibly proceeding with the PRP injection for this issue.  She will contact if interested in proceeding with PRP versus ultrasound guided cortisone injection.           History of Present Illness   HPI  Rebecca Ferrara is a 49 y.o. female who presents today for consultation visit to discuss possible PRP injection for right shoulder versus ultrasound-guided cortisone injection for her right shoulder.  Patient has been following with Dr. Camacho and had completed a recent MRI study which revealed proximal bicep tendinitis.  Was referred today to discuss possible treatment options.    History obtained from: patient  Review of Systems  Pertinent Medical History   Hashimoto's  Asthma           Objective   Ht 5' 3\" (1.6 m)   Wt 83.9 kg (185 lb)   BMI 32.77 kg/m²      Physical Exam        Objective:  Ht 5' 3\" (1.6 m)   Wt 83.9 kg (185 lb)   BMI 32.77 kg/m²     Skin: no rashes, lesions, skin discolorations, lacerations  Vasculature: normal radial and ulnar pulse,  normal skin color, normal capillary refill in extremity, no upper extremity edema  Neurologic: Neurologic exam is normal throughout upper extremities, Awake, alert, and oriented x3, no apparent distress.   Musculoskeletal: "   right SHOULDER EXAM    General: no gross deformity, no skin changes redness or warmth noted, no sign of infection    ROM:   FF (180): 180  ER (90): 90  IR : t spine        Supraspinatus strength testin/5  Infraspinatus strength testin/5    Belly press: negative      Empty can: positive  Leigh: +  [place] -  Speed +  Biceps TTP: positive      [place]'s test:negative  Scapular posturing: good            Administrative Statements   I have spent a total time of 30 minutes in caring for this patient on the day of the visit/encounter including Impressions, Documenting in the medical record, Reviewing/placing orders in the medical record (including tests, medications, and/or procedures), and Obtaining or reviewing history  .

## 2025-04-26 ENCOUNTER — APPOINTMENT (OUTPATIENT)
Dept: LAB | Facility: HOSPITAL | Age: 50
End: 2025-04-26
Payer: COMMERCIAL

## 2025-04-26 LAB
ALBUMIN SERPL BCG-MCNC: 4.2 G/DL (ref 3.5–5)
ALP SERPL-CCNC: 54 U/L (ref 34–104)
ALT SERPL W P-5'-P-CCNC: 20 U/L (ref 7–52)
ANION GAP SERPL CALCULATED.3IONS-SCNC: 4 MMOL/L (ref 4–13)
AST SERPL W P-5'-P-CCNC: 18 U/L (ref 13–39)
BASOPHILS # BLD AUTO: 0.03 THOUSANDS/ÂΜL (ref 0–0.1)
BASOPHILS NFR BLD AUTO: 0 % (ref 0–1)
BILIRUB SERPL-MCNC: 0.69 MG/DL (ref 0.2–1)
BUN SERPL-MCNC: 10 MG/DL (ref 5–25)
CALCIUM SERPL-MCNC: 8.8 MG/DL (ref 8.4–10.2)
CHLORIDE SERPL-SCNC: 103 MMOL/L (ref 96–108)
CHOLEST SERPL-MCNC: 235 MG/DL (ref ?–200)
CO2 SERPL-SCNC: 29 MMOL/L (ref 21–32)
CREAT SERPL-MCNC: 0.66 MG/DL (ref 0.6–1.3)
EOSINOPHIL # BLD AUTO: 0.67 THOUSAND/ÂΜL (ref 0–0.61)
EOSINOPHIL NFR BLD AUTO: 7 % (ref 0–6)
ERYTHROCYTE [DISTWIDTH] IN BLOOD BY AUTOMATED COUNT: 12.8 % (ref 11.6–15.1)
FERRITIN SERPL-MCNC: 26 NG/ML (ref 30–307)
GFR SERPL CREATININE-BSD FRML MDRD: 105 ML/MIN/1.73SQ M
GLUCOSE P FAST SERPL-MCNC: 101 MG/DL (ref 65–99)
HCT VFR BLD AUTO: 40.5 % (ref 34.8–46.1)
HDLC SERPL-MCNC: 62 MG/DL
HGB BLD-MCNC: 13.4 G/DL (ref 11.5–15.4)
IMM GRANULOCYTES # BLD AUTO: 0.03 THOUSAND/UL (ref 0–0.2)
IMM GRANULOCYTES NFR BLD AUTO: 0 % (ref 0–2)
IRON SATN MFR SERPL: 52 % (ref 15–50)
IRON SERPL-MCNC: 165 UG/DL (ref 50–212)
LDLC SERPL CALC-MCNC: 147 MG/DL (ref 0–100)
LYMPHOCYTES # BLD AUTO: 2.15 THOUSANDS/ÂΜL (ref 0.6–4.47)
LYMPHOCYTES NFR BLD AUTO: 23 % (ref 14–44)
MCH RBC QN AUTO: 30.7 PG (ref 26.8–34.3)
MCHC RBC AUTO-ENTMCNC: 33.1 G/DL (ref 31.4–37.4)
MCV RBC AUTO: 93 FL (ref 82–98)
MONOCYTES # BLD AUTO: 0.69 THOUSAND/ÂΜL (ref 0.17–1.22)
MONOCYTES NFR BLD AUTO: 7 % (ref 4–12)
NEUTROPHILS # BLD AUTO: 5.87 THOUSANDS/ÂΜL (ref 1.85–7.62)
NEUTS SEG NFR BLD AUTO: 63 % (ref 43–75)
NRBC BLD AUTO-RTO: 0 /100 WBCS
PLATELET # BLD AUTO: 326 THOUSANDS/UL (ref 149–390)
PMV BLD AUTO: 9.4 FL (ref 8.9–12.7)
POTASSIUM SERPL-SCNC: 3.9 MMOL/L (ref 3.5–5.3)
PROT SERPL-MCNC: 7 G/DL (ref 6.4–8.4)
RBC # BLD AUTO: 4.37 MILLION/UL (ref 3.81–5.12)
SODIUM SERPL-SCNC: 136 MMOL/L (ref 135–147)
TIBC SERPL-MCNC: 317.8 UG/DL (ref 250–450)
TRANSFERRIN SERPL-MCNC: 227 MG/DL (ref 203–362)
TRIGL SERPL-MCNC: 129 MG/DL (ref ?–150)
UIBC SERPL-MCNC: 153 UG/DL (ref 155–355)
WBC # BLD AUTO: 9.44 THOUSAND/UL (ref 4.31–10.16)

## 2025-04-26 PROCEDURE — 80061 LIPID PANEL: CPT

## 2025-04-26 PROCEDURE — 83550 IRON BINDING TEST: CPT

## 2025-04-26 PROCEDURE — 80053 COMPREHEN METABOLIC PANEL: CPT

## 2025-04-26 PROCEDURE — 82728 ASSAY OF FERRITIN: CPT

## 2025-04-26 PROCEDURE — 83540 ASSAY OF IRON: CPT

## 2025-04-26 PROCEDURE — 85025 COMPLETE CBC W/AUTO DIFF WBC: CPT

## 2025-04-28 ENCOUNTER — RESULTS FOLLOW-UP (OUTPATIENT)
Dept: INTERNAL MEDICINE CLINIC | Facility: CLINIC | Age: 50
End: 2025-04-28

## 2025-04-28 ENCOUNTER — OFFICE VISIT (OUTPATIENT)
Dept: ENDOCRINOLOGY | Facility: CLINIC | Age: 50
End: 2025-04-28
Payer: COMMERCIAL

## 2025-04-28 ENCOUNTER — APPOINTMENT (OUTPATIENT)
Dept: LAB | Facility: HOSPITAL | Age: 50
End: 2025-04-28
Payer: COMMERCIAL

## 2025-04-28 ENCOUNTER — HOSPITAL ENCOUNTER (OUTPATIENT)
Dept: MAMMOGRAPHY | Facility: CLINIC | Age: 50
Discharge: HOME | End: 2025-04-28
Payer: COMMERCIAL

## 2025-04-28 VITALS
HEART RATE: 80 BPM | HEIGHT: 63 IN | RESPIRATION RATE: 18 BRPM | OXYGEN SATURATION: 99 % | WEIGHT: 183.4 LBS | DIASTOLIC BLOOD PRESSURE: 72 MMHG | BODY MASS INDEX: 32.5 KG/M2 | TEMPERATURE: 98.1 F | SYSTOLIC BLOOD PRESSURE: 130 MMHG

## 2025-04-28 DIAGNOSIS — E04.1 THYROID NODULE: Primary | ICD-10-CM

## 2025-04-28 DIAGNOSIS — E06.3 HASHIMOTO'S DISEASE: ICD-10-CM

## 2025-04-28 DIAGNOSIS — Z12.31 ENCOUNTER FOR SCREENING MAMMOGRAM FOR MALIGNANT NEOPLASM OF BREAST: ICD-10-CM

## 2025-04-28 PROCEDURE — 99214 OFFICE O/P EST MOD 30 MIN: CPT | Performed by: STUDENT IN AN ORGANIZED HEALTH CARE EDUCATION/TRAINING PROGRAM

## 2025-04-28 PROCEDURE — 77067 SCR MAMMO BI INCL CAD: CPT

## 2025-04-28 PROCEDURE — 77063 BREAST TOMOSYNTHESIS BI: CPT

## 2025-04-28 RX ORDER — VITAMIN B COMPLEX
TABLET ORAL
COMMUNITY
Start: 2025-02-01

## 2025-04-28 NOTE — PROGRESS NOTES
Name: Rebecca Ferrara      : 1975      MRN: [id number]  Encounter Provider: Milo Singh MD  Encounter Date: 2025   Encounter department: [facility] [place]    Chief Complaint   Patient presents with   • Advice Only   :  Assessment & Plan  Thyroid nodule    An ultrasound performed in 2025 revealed a 2.1 cm left-sided nodule, which is categorized as low suspicion with a 5-10% risk of malignancy. The biopsy procedure, was explained. A referral for an ultrasound guided FNA biopsy has been made.   Pathophysiology of thyroid nodule discussed, and possible results of biopsy and management of each results discussed.  We discussed The discomfort she experiences is likely related to her sternocleidomastoid (SCM) muscle rather than her thyroid.   Orders:  •  US guided thyroid biopsy with molecular testing; Future    Hashimoto's disease  The patient's thyroid function is currently compensating adequately, but there is a potential risk of progression to hypothyroidism.  Her thyroid is mildly enlarged, which could be attributed to Hashimoto's thyroiditis. The possibility of taking selenium supplements was discussed.             History of Present Illness   History of Present Illness    Rebecca Ferrara is a 49 y.o. female who presents for evaluation of Hashimoto's thyroiditis and thyroid nodule.    The patient is a 49-year-old female who presents for evaluation of Hashimoto's thyroiditis.    She was seen by  and Dr Bowden at [facility], and [facility] most recently.for Hashimoto's disease, despite normal thyroid function tests. She continues to monitor her condition through regular blood work, which remains within normal limits.   he perceives an asymmetry in her thyroid, with one side appearing larger than the other. A subsequent consultation with Elly nurse practitioner at [facility] resulted in additional blood work, all of which was normal,  and she was advised that an ultrasound was unnecessary. Seeking a second opinion, she consulted with Dr. Kurtz at [facility] in 12/2024, who confirmed the presence of a nodule on the left side and recommended a biopsy. She has not yet undergone the biopsy. She reports discomfort, but not pain, in the right side of her neck, which is more prominent when she looks in the mirror. She does not experience difficulty breathing, attributing this to her asthma. She has always had difficulty swallowing pills, including Claritin, but does not have difficulty swallowing food. She has no history of radiation exposure to the head or neck. She reports no symptoms of diarrhea, constipation, hair loss, brittle nails, or dry skin.    She experiences fatigue and weight fluctuations, losing and gaining approximately one pound over the years. She wakes up around 3:00 AM to urinate but does not have difficulty falling back asleep. She snores and has been evaluated for sleep apnea but does not use a CPAP machine. Her menstrual cycles are irregular, ranging from 103-day to 18-day cycles, a pattern that has been consistent for the past 2 years. She occasionally experiences hot flashes and night sweats, with daily night sweats reported about a year ago. She recently experienced a severe hot flash while shopping.    Supplemental Information  She has a history of asthma and was hospitalized for an asthma flare in either 2017 or 2019, during which she was treated with prednisone for a week. She was on prednisone for 6 months when she was 22 for eczema. She was recently diagnosed with a small rotator cuff tear and bicep tendinitis. The doctor is not recommending surgery at this point and wants to do platelet-rich plasma.    FAMILY HISTORY  Her grandmother on her mother's side may have had possibly thyroid issues.        Pertinent Medical History           Review of Systems as per HPI  Medical History Reviewed by provider  "this encounter:     .  Current Outpatient Medications on File Prior to Visit   Medication Sig Dispense Refill   • albuterol (PROVENTIL HFA,VENTOLIN HFA) 90 mcg/act inhaler Inhale 2 puffs if needed for wheezing     • Arnuity Ellipta 100 MCG/ACT AEPB inhaler      • cholecalciferol (VITAMIN D3) 25 mcg (1,000 units) tablet      • Fish Oil-Cholecalciferol (COROMEGA OMEGA 3+D SQUEEZE PO)      • Multiple Vitamins-Minerals (MULTIVITAMIN GUMMIES WOMENS PO)      • loratadine (CLARITIN) 10 mg tablet Take 10 mg by mouth daily (Patient not taking: Reported on 4/28/2025)       No current facility-administered medications on file prior to visit.         Medical History Reviewed by provider this encounter:     .    Objective   /72 (BP Location: Left arm, Patient Position: Sitting, Cuff Size: Standard)   Pulse 80   Temp 98.1 °F (36.7 °C) (Temporal)   Resp 18   Ht 5' 3\" (1.6 m)   Wt 83.2 kg (183 lb 6.4 oz)   SpO2 99%   BMI 32.49 kg/m²      Body mass index is 32.49 kg/m².  Wt Readings from Last 3 Encounters:   04/28/25 83.2 kg (183 lb 6.4 oz)   04/23/25 83.9 kg (185 lb)   04/18/25 83.1 kg (183 lb 3.2 oz)     Physical Exam  Constitutional:       General: She is not in acute distress.     Appearance: She is not ill-appearing.   HENT:      Head: Normocephalic and atraumatic.   Neck:      Comments: Slightly enlarged thyroid   Pulmonary:      Effort: Pulmonary effort is normal. No respiratory distress.   Neurological:      Mental Status: She is oriented to person, place, and time.       Physical Exam  The thyroid is slightly enlarged.    Results  Laboratory Studies  Positive antibody for Hashimoto's. Thyroid test is normal.    Imaging  Ultrasound shows no nodules and everything looked okay. Ultrasound done in January showed a nodule on the left side.  Labs:   Lab Results   Component Value Date    HGBA1C 5.3 03/01/2025    HGBA1C 5.3 05/10/2024    HGBA1C 5.1 07/13/2022     Lab Results   Component Value Date    CREATININE 0.66 " 04/26/2025    CREATININE 0.74 05/10/2024    CREATININE 0.80 07/13/2022    BUN 10 04/26/2025     01/13/2014    K 3.9 04/26/2025     04/26/2025    CO2 29 04/26/2025     EGFR   Date Value Ref Range Status   06/23/2021 82.4 >=60.0 mL/min Final     Comment:     If patient is , multiply estimated GFR by 1.159.   08/26/2020 >60.0 >60 Final     Comment:     If patient is , multiply estimated GFR by 1.159.     eGFR   Date Value Ref Range Status   04/26/2025 105 ml/min/1.73sq m Final     Lab Results   Component Value Date    HDL 62 04/26/2025    TRIG 129 04/26/2025     Lab Results   Component Value Date    ALT 20 04/26/2025    AST 18 04/26/2025    ALKPHOS 54 04/26/2025    BILITOT 0.5 01/13/2014     Lab Results   Component Value Date    YPH4JOZFELMH 1.718 03/01/2025    VMZ1EVQGNRHM 1.876 05/10/2024    FTN5DYIWIUBD 2.411 10/17/2022     Lab Results   Component Value Date    FREET4 0.69 03/01/2025       There are no Patient Instructions on file for this visit.    Discussed with the patient and all questioned fully answered. She will call me if any problems arise.    Administrative Statements   I have spent a total time of 40 minutes in caring for this patient on the day of the visit/encounter including Diagnostic results, Prognosis, Risks and benefits of tx options, Instructions for management, Patient and family education, Importance of tx compliance, Risk factor reductions, Impressions, Counseling / Coordination of care, Documenting in the medical record, Reviewing/placing orders in the medical record (including tests, medications, and/or procedures), and Obtaining or reviewing history  .

## 2025-04-29 ENCOUNTER — TELEPHONE (OUTPATIENT)
Dept: RADIOLOGY | Facility: HOSPITAL | Age: 50
End: 2025-04-29

## 2025-04-29 PROBLEM — M67.813 BICEPS TENDINOSIS OF RIGHT SHOULDER: Status: ACTIVE | Noted: 2025-04-29

## 2025-04-29 NOTE — ASSESSMENT & PLAN NOTE
The patient's thyroid function is currently compensating adequately, but there is a potential risk of progression to hypothyroidism.  Her thyroid is mildly enlarged, which could be attributed to Hashimoto's thyroiditis. The possibility of taking selenium supplements was discussed.

## 2025-04-29 NOTE — TELEPHONE ENCOUNTER
Ms. Ferrara was ordered a FNA of a Left lower  pole thyroid nodule which measured 2.1 x 1.2 x 1.3 cm on thyroid US from [facility] on 1/28/25.       Ms. Ferrara's images from [facility] and [facility] were reviewed. In setting of a heterogenous gland, the thyroid ultrasound has remained stable since 10/2022. The is no nodule that would meet current ACR criteria for FNA.     I discussed the above with Dr. Singh ( Endocrine ). At this time, Dr. Singh will contact the patient to discuss the above. Will cancel FNA at this time. Please contact us directly with any questions or concerns.     Marga Barker PAC

## 2025-04-29 NOTE — ASSESSMENT & PLAN NOTE
We discussed the nature of proximal biceps tendinitis which was confirmed on MRI study.  Treatment options were discussed for bicep tendinitis including physical therapy, oral anti-inflammatory medication, consideration for injection treatment including both cortisone injection under ultrasound guidance versus PRP injection.  Reading information was provided in regards to PRP injection treatment.  Questions were answered in regards to treatment options.  Patient is interested in possibly proceeding with the PRP injection for this issue.  She will contact if interested in proceeding with PRP versus ultrasound guided cortisone injection.

## 2025-05-01 ENCOUNTER — RESULTS FOLLOW-UP (OUTPATIENT)
Dept: OBGYN CLINIC | Facility: CLINIC | Age: 50
End: 2025-05-01

## 2025-05-15 ENCOUNTER — OFFICE VISIT (OUTPATIENT)
Age: 50
End: 2025-05-15
Payer: COMMERCIAL

## 2025-05-15 VITALS
OXYGEN SATURATION: 96 % | BODY MASS INDEX: 32.25 KG/M2 | DIASTOLIC BLOOD PRESSURE: 84 MMHG | HEART RATE: 86 BPM | SYSTOLIC BLOOD PRESSURE: 124 MMHG | HEIGHT: 63 IN | WEIGHT: 182 LBS | TEMPERATURE: 98.4 F

## 2025-05-15 DIAGNOSIS — E66.811 CLASS 1 OBESITY DUE TO EXCESS CALORIES WITHOUT SERIOUS COMORBIDITY WITH BODY MASS INDEX (BMI) OF 32.0 TO 32.9 IN ADULT: ICD-10-CM

## 2025-05-15 DIAGNOSIS — J45.30 MILD PERSISTENT ASTHMA WITHOUT COMPLICATION: Primary | ICD-10-CM

## 2025-05-15 DIAGNOSIS — E66.09 CLASS 1 OBESITY DUE TO EXCESS CALORIES WITHOUT SERIOUS COMORBIDITY WITH BODY MASS INDEX (BMI) OF 32.0 TO 32.9 IN ADULT: ICD-10-CM

## 2025-05-15 PROCEDURE — 99204 OFFICE O/P NEW MOD 45 MIN: CPT | Performed by: INTERNAL MEDICINE

## 2025-05-15 PROCEDURE — 94010 BREATHING CAPACITY TEST: CPT | Performed by: INTERNAL MEDICINE

## 2025-05-15 NOTE — PROGRESS NOTES
Consultation - Pulmonary Medicine   Name: Rebecca Ferrara      : 1975      MRN: [id number]  Encounter Provider: Shantal Tejada MD  Encounter Date: 5/15/2025   Encounter department: [facility]    Requesting Provider:   No referring provider defined for this encounter.     Reason for Consult: Asthma:  Assessment & Plan  Mild persistent asthma without complication  Mild persistent asthma by history currently on the Arnuity and has not had the need to use the rescue inhaler at all in several months no nocturnal awakenings no recent flareups last hospital admission in 2019  Spirometry in the office today was normal with an FEV1 of 89.2%.  Discussed with the patient to continue with Arnuity 1 puff daily  Rinse mouth after use  She states Arnuity is being paid only for a month basis, she wants 90-day supply which is expensive discussed alternatives with Flovent and budesonide or Pulmicort flex inhaler she is going to talk to her pharmacist to see which one works are cheaper and she will let us know so that we can send a 90-day supply for that.  Also the albuterol she does have the prior prescription and she will call us if she needs it given that she has not had the need to use it in the several months  She does have a peak flow at home which to monitor  Asthma action plan discussed green zone yellow zone red zone understands and verbalizes    Orders:    POCT spirometry    Class 1 obesity due to excess calories without serious comorbidity with body mass index (BMI) of 32.0 to 32.9 in adult  Recommend weight loss         No follow-ups on file.  History of Present Illness   Rebecca Ferrara is a 49 y.o. female who presents for initial evaluation she was seen in the hospital in 2019 after she was following up with an allergist now wants to establish with pulmonary here.  Patient was diagnosed with mild persistent asthma for several years now, states she has significant environmental  allergies with grass pollen especially during the springtime she had 1 hospital admission for asthma exacerbation in 2019 after which she has not had any flareups she states she has been being maintained on maintenance inhalers initially she was on Breo for several years then switched to Symbicort and then she was stepdown to Arnuity and has been on it for at least past few years, she does have a dog with sheds minimally, and also has fish and a lizard a abuta dragon     Review of Systems   Constitutional:  Negative for appetite change and fever.   HENT:  Negative for ear pain, postnasal drip, rhinorrhea, sneezing and sore throat.    Cardiovascular:  Negative for chest pain.   Musculoskeletal:  Negative for myalgias.   Neurological:  Negative for headaches.       Aside from what is mentioned in the HPI, ROS is otherwise negative    Medical History Reviewed by provider this encounter:  Tobacco  Allergies  Meds  Problems  Med Hx  Surg Hx  Fam Hx     .    Historical Information   Past Medical History:   Diagnosis Date    Abnormal Pap smear of cervix     Several years ago    Allergic     Too many years ago to know exact date    Allergic rhinitis     15+ years    Anxiety     Too many years ago to know exact date    Asthma     Dermatitis     Disease of thyroid gland     Dizziness     Occasionally    Dyslipidemia, goal LDL below 160     Fatigue     A few years    HL (hearing loss)     Memory loss     Obesity     Borderline for 20 years    Ovarian cyst 02/2008    Tinnitus     3+ years ago    Varicella without complication     3 times    Visual impairment     Since I was 6 years old     Past Surgical History:   Procedure Laterality Date    NO PAST SURGERIES       Social History   Tobacco Use History[1]    Occupational/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    Family History:   Family History   Problem Relation Age of Onset    Deep vein thrombosis Mother     Diabetes Father     Hepatitis Father         Hep C     "Diabetes type I Father     No Known Problems Sister     Stroke Maternal Grandmother     Anemia Maternal Grandmother     Vision loss Maternal Grandmother     Varicose Veins Maternal Grandmother     Breast cancer Paternal Grandmother 65    Cancer Paternal Grandmother         Breast    Varicose Veins Paternal Grandmother     Stroke Paternal Grandfather     Coronary artery disease Paternal Grandfather     No Known Problems Maternal Aunt     Anemia Maternal Aunt     No Known Problems Paternal Aunt        Objective   /84   Pulse 86   Temp 98.4 °F (36.9 °C)   Ht 5' 3\" (1.6 m)   Wt 82.6 kg (182 lb)   SpO2 96%   BMI 32.24 kg/m²      Physical Exam  Vitals and nursing note reviewed.   Constitutional:       Appearance: She is well-developed.   HENT:      Head: Normocephalic and atraumatic.     Eyes:      Conjunctiva/sclera: Conjunctivae normal.      Pupils: Pupils are equal, round, and reactive to light.     Neck:      Thyroid: No thyromegaly.      Vascular: No JVD.     Cardiovascular:      Rate and Rhythm: Normal rate and regular rhythm.      Heart sounds: Normal heart sounds. No murmur heard.     No friction rub. No gallop.   Pulmonary:      Effort: Pulmonary effort is normal. No respiratory distress.      Breath sounds: Normal breath sounds. No wheezing or rales.   Chest:      Chest wall: No tenderness.   Abdominal:      General: Bowel sounds are normal.     Musculoskeletal:         General: No tenderness or deformity. Normal range of motion.      Cervical back: Normal range of motion and neck supple.   Lymphadenopathy:      Cervical: No cervical adenopathy.     Skin:     General: Skin is warm and dry.     Neurological:      Mental Status: She is alert and oriented to person, place, and time.           Diagnostic Data:  Labs: I personally reviewed the most recent laboratory data pertinent to today's visit.      Radiology results:  Radiology Results Review : No pertinent imaging studies " reviewed.      PFT/spirometry results:  Spirometry in the office today was normal with FEV1 of 89.2%    Shantal Tejada MD      Answers submitted by the patient for this visit:  Pulmonology Questionnaire (Submitted on 5/14/2025)  Chief Complaint: Primary symptoms  Chronicity: chronic  When did you first notice your symptoms?: more than 1 year ago  How often do your symptoms occur?: rarely  Since you first noticed this problem, how has it changed?: unchanged  Do you have shortness of breath that occurs with effort or exertion?: Yes  Do you have ear congestion?: Yes  Do you have heartburn?: Yes  Do you have fatigue?: Yes  Do you have nasal congestion?: No  Do you have shortness of breath when lying flat?: No  Do you have shortness of breath when you wake up?: No  Have you experienced weight loss?: No  Which of the following makes your symptoms worse?: change in weather, climbing stairs, emotional stress, exercise, exposure to fumes, exposure to smoke, pollen, strenuous activity, URI  Which of the following makes your symptoms better?: steroid inhaler  Risk factors for lung disease: animal exposure           [1]   Social History  Tobacco Use   Smoking Status Never   Smokeless Tobacco Never

## 2025-05-15 NOTE — ASSESSMENT & PLAN NOTE
Mild persistent asthma by history currently on the Arnuity and has not had the need to use the rescue inhaler at all in several months no nocturnal awakenings no recent flareups last hospital admission in 2019  Spirometry in the office today was normal with an FEV1 of 89.2%.  Discussed with the patient to continue with Arnuity 1 puff daily  Rinse mouth after use  She states Arnuity is being paid only for a month basis, she wants 90-day supply which is expensive discussed alternatives with Flovent and budesonide or Pulmicort flex inhaler she is going to talk to her pharmacist to see which one works are cheaper and she will let us know so that we can send a 90-day supply for that.  Also the albuterol she does have the prior prescription and she will call us if she needs it given that she has not had the need to use it in the several months  She does have a peak flow at home which to monitor  Asthma action plan discussed green zone yellow zone red zone understands and verbalizes    Orders:    POCT spirometry     Dr. Mainor Potter

## 2025-05-30 ENCOUNTER — TELEPHONE (OUTPATIENT)
Dept: OBGYN CLINIC | Facility: CLINIC | Age: 50
End: 2025-05-30

## 2025-05-30 ENCOUNTER — APPOINTMENT (OUTPATIENT)
Dept: LAB | Facility: CLINIC | Age: 50
End: 2025-05-30
Payer: COMMERCIAL

## 2025-05-30 DIAGNOSIS — T78.00XA ANAPHYLACTIC SHOCK DUE TO FOOD, INITIAL ENCOUNTER: ICD-10-CM

## 2025-05-30 DIAGNOSIS — T78.1XXA ADVERSE FOOD REACTION, INITIAL ENCOUNTER: ICD-10-CM

## 2025-05-30 PROCEDURE — 86003 ALLG SPEC IGE CRUDE XTRC EA: CPT

## 2025-05-30 PROCEDURE — 86008 ALLG SPEC IGE RECOMB EA: CPT

## 2025-05-30 PROCEDURE — 86008 ALLG SPEC IGE RECOMB EA: CPT | Performed by: ALLERGY & IMMUNOLOGY

## 2025-05-30 PROCEDURE — 36415 COLL VENOUS BLD VENIPUNCTURE: CPT

## 2025-05-30 NOTE — TELEPHONE ENCOUNTER
Call to this patient to reschedule the PRP injection that was canceled for 06/02/2025. No answer left message to call Dr Comer's office in [place] to reschedule the [facility] PRP injection.

## 2025-06-02 LAB
ARA H6 PEANUT: <0.1 KUA/I (ref 0–0.1)
CHICKEN MEAT IGE QN: <0.1 KUA/I (ref 0–0.1)
CORN IGE QN: 0.57 KUA/I (ref 0–0.1)
PEANUT (RARA H) 1 IGE QN: <0.1 KUA/I (ref 0–0.1)
PEANUT (RARA H) 2 IGE QN: <0.1 KUA/I (ref 0–0.1)
PEANUT (RARA H) 3 IGE QN: <0.1 KUA/I (ref 0–0.1)
PEANUT (RARA H) 8 IGE QN: <0.1 KUA/I (ref 0–0.1)
PEANUT (RARA H) 9 IGE QN: <0.1 KUA/I (ref 0–0.1)
PEANUT IGE QN: 1.1 KUA/I (ref 0–0.1)
PECAN/HICK NUT IGE QN: <0.1 KUA/I (ref 0–0.1)
PISTACHIO IGE QN: 0.42 KUA/I (ref 0–0.1)
WHEAT IGE QN: 0.7 KUA/I (ref 0–0.1)

## 2025-06-03 LAB
APPLE IGE QN: 0.73 KU/L
BARLEY IGE QN: 0.41 KU/L
CARROT IGE QN: 0.89 KU/L
CHOCOLATE IGE QN: <0.1 KU/L
CINNAMON IGE QN: <0.1 KU/L
GARLIC IGE QN: 1.06 KU/L
MACADAMIA IGE QN: 0.59 KU/L
MALT IGE QN: 0.7 KU/L
MISCELLANEOUS LAB TEST RESULT: NORMAL
MISCELLANEOUS LAB TEST RESULT: NORMAL
ORANGE IGE QN: 0.59 KU/L
PEA IGE QN: 0.13 KU/L
PINE NUT IGE QN: 0.19 KU/L
PINEAPPLE IGE QN: 0.94 KU/L
POTATO IGE QN: 0.79 KU/L

## 2025-06-04 LAB
BUCKWHEAT IGE QN: 0.63 KU/L
GREEN PEPPER IGE QN: <0.1 KU/L
MISCELLANEOUS LAB TEST RESULT: NORMAL

## 2025-06-05 ENCOUNTER — TELEPHONE (OUTPATIENT)
Dept: FAMILY MEDICINE CLINIC | Facility: CLINIC | Age: 50
End: 2025-06-05

## 2025-06-05 LAB — MISCELLANEOUS LAB TEST RESULT: NORMAL

## 2025-06-05 NOTE — TELEPHONE ENCOUNTER
Diagnosis code changed on requested labs. See additional MyChart thread in chart. Message sent to billing to see if this claim can be resubmitted.

## 2025-07-01 PROBLEM — Z91.018 FOOD ALLERGY: Status: ACTIVE | Noted: 2025-07-01

## 2025-07-01 PROBLEM — S46.009A ROTATOR CUFF INJURY: Status: ACTIVE | Noted: 2025-04-28

## 2025-07-01 PROBLEM — H10.13 ALLERGIC CONJUNCTIVITIS OF BOTH EYES: Status: ACTIVE | Noted: 2025-07-01

## 2025-07-01 PROBLEM — R60.9 LIPEDEMA: Status: ACTIVE | Noted: 2025-07-01

## 2025-07-09 ENCOUNTER — VBI (OUTPATIENT)
Dept: ADMINISTRATIVE | Facility: OTHER | Age: 50
End: 2025-07-09

## 2025-07-09 NOTE — TELEPHONE ENCOUNTER
07/09/25 12:55 PM     Chart reviewed for [facility]: Colonoscopy was/were not submitted to the patient's insurance.     Amanda Sy MA   [facility]

## 2025-07-10 DIAGNOSIS — E66.811 CLASS 1 OBESITY DUE TO EXCESS CALORIES WITHOUT SERIOUS COMORBIDITY WITH BODY MASS INDEX (BMI) OF 32.0 TO 32.9 IN ADULT: Primary | ICD-10-CM

## 2025-07-10 DIAGNOSIS — E66.09 CLASS 1 OBESITY DUE TO EXCESS CALORIES WITHOUT SERIOUS COMORBIDITY WITH BODY MASS INDEX (BMI) OF 32.0 TO 32.9 IN ADULT: Primary | ICD-10-CM

## 2025-07-15 ENCOUNTER — OFFICE VISIT (OUTPATIENT)
Dept: OBGYN CLINIC | Facility: CLINIC | Age: 50
End: 2025-07-15
Payer: COMMERCIAL

## 2025-07-15 VITALS
DIASTOLIC BLOOD PRESSURE: 72 MMHG | SYSTOLIC BLOOD PRESSURE: 126 MMHG | WEIGHT: 175 LBS | HEIGHT: 63 IN | BODY MASS INDEX: 31.01 KG/M2

## 2025-07-15 DIAGNOSIS — Z01.419 ENCOUNTER FOR GYNECOLOGICAL EXAMINATION WITHOUT ABNORMAL FINDING: Primary | ICD-10-CM

## 2025-07-15 DIAGNOSIS — Z12.31 ENCOUNTER FOR SCREENING MAMMOGRAM FOR MALIGNANT NEOPLASM OF BREAST: ICD-10-CM

## 2025-07-15 PROCEDURE — S0612 ANNUAL GYNECOLOGICAL EXAMINA: HCPCS | Performed by: OBSTETRICS & GYNECOLOGY

## 2025-07-31 PROBLEM — H10.13 ALLERGIC CONJUNCTIVITIS OF BOTH EYES: Status: RESOLVED | Noted: 2025-07-01 | Resolved: 2025-07-31

## 2025-08-07 ENCOUNTER — TELEPHONE (OUTPATIENT)
Dept: CARDIOLOGY CLINIC | Facility: CLINIC | Age: 50
End: 2025-08-07

## 2025-08-09 ENCOUNTER — PATIENT MESSAGE (OUTPATIENT)
Age: 50
End: 2025-08-09

## 2025-08-18 ENCOUNTER — OFFICE VISIT (OUTPATIENT)
Age: 50
End: 2025-08-18
Payer: COMMERCIAL

## 2025-08-18 VITALS
BODY MASS INDEX: 31.43 KG/M2 | SYSTOLIC BLOOD PRESSURE: 118 MMHG | DIASTOLIC BLOOD PRESSURE: 76 MMHG | TEMPERATURE: 97.1 F | OXYGEN SATURATION: 98 % | RESPIRATION RATE: 18 BRPM | HEART RATE: 88 BPM | WEIGHT: 177.4 LBS | HEIGHT: 63 IN

## 2025-08-18 DIAGNOSIS — R73.9 ELEVATED SERUM GLUCOSE: ICD-10-CM

## 2025-08-18 DIAGNOSIS — E55.9 VITAMIN D DEFICIENCY: ICD-10-CM

## 2025-08-18 DIAGNOSIS — Z13.6 ENCOUNTER FOR LIPID SCREENING FOR CARDIOVASCULAR DISEASE: ICD-10-CM

## 2025-08-18 DIAGNOSIS — J45.30 MILD PERSISTENT ASTHMA WITHOUT COMPLICATION: ICD-10-CM

## 2025-08-18 DIAGNOSIS — N95.1 MENOPAUSAL SYMPTOM: Primary | ICD-10-CM

## 2025-08-18 DIAGNOSIS — E78.5 DYSLIPIDEMIA: ICD-10-CM

## 2025-08-18 DIAGNOSIS — Z13.220 ENCOUNTER FOR LIPID SCREENING FOR CARDIOVASCULAR DISEASE: ICD-10-CM

## 2025-08-18 DIAGNOSIS — E53.8 VITAMIN B12 DEFICIENCY: ICD-10-CM

## 2025-08-18 DIAGNOSIS — E66.811 CLASS 1 OBESITY DUE TO EXCESS CALORIES WITHOUT SERIOUS COMORBIDITY WITH BODY MASS INDEX (BMI) OF 32.0 TO 32.9 IN ADULT: ICD-10-CM

## 2025-08-18 DIAGNOSIS — E06.3 HASHIMOTO'S DISEASE: ICD-10-CM

## 2025-08-18 DIAGNOSIS — E66.09 CLASS 1 OBESITY DUE TO EXCESS CALORIES WITHOUT SERIOUS COMORBIDITY WITH BODY MASS INDEX (BMI) OF 32.0 TO 32.9 IN ADULT: ICD-10-CM

## 2025-08-18 PROCEDURE — 99214 OFFICE O/P EST MOD 30 MIN: CPT | Performed by: FAMILY MEDICINE
